# Patient Record
Sex: MALE
[De-identification: names, ages, dates, MRNs, and addresses within clinical notes are randomized per-mention and may not be internally consistent; named-entity substitution may affect disease eponyms.]

---

## 2017-09-11 ENCOUNTER — RX ONLY (OUTPATIENT)
Age: 82
Setting detail: RX ONLY
End: 2017-09-11

## 2018-01-03 ENCOUNTER — APPOINTMENT (RX ONLY)
Dept: URBAN - METROPOLITAN AREA CLINIC 114 | Facility: CLINIC | Age: 83
Setting detail: DERMATOLOGY
End: 2018-01-03

## 2018-01-03 PROBLEM — C44.311 BASAL CELL CARCINOMA OF SKIN OF NOSE: Status: ACTIVE | Noted: 2018-01-03

## 2018-01-03 PROBLEM — L57.0 ACTINIC KERATOSIS: Status: ACTIVE | Noted: 2018-01-03

## 2018-01-03 PROBLEM — L40.4 GUTTATE PSORIASIS: Status: ACTIVE | Noted: 2018-01-03

## 2018-01-03 PROCEDURE — ? SUPERFICIAL RADIATION TREATMENT

## 2018-01-03 PROCEDURE — 77332 RADIATION TREATMENT AID(S): CPT

## 2018-01-03 PROCEDURE — 77300 RADIATION THERAPY DOSE PLAN: CPT

## 2018-01-03 PROCEDURE — 77280 THER RAD SIMULAJ FIELD SMPL: CPT

## 2018-01-03 PROCEDURE — 77401: CPT

## 2018-01-03 NOTE — PROCEDURE: SUPERFICIAL RADIATION TREATMENT
Field Size (Applicator): 2.0 cm
Port Dimensions-Y Axis In Cm: 1.7
Cumulative Dose In Cgy (Optional): 320.5
Day Of The Week Treatment Administered: Wednesday
Total Number Of Fractions Rx 4: 15
Fractions / Week Rx 3: 3
Shielding Size (Optional- Include Units): 1.7  cm
Include Rx 2 When Rendering Additional Prescriptions: No
Computed Treatment Time In Min (Will Render The Same As Calculated Treatment Time If Left Blank): .49
Daily Fractionated Dose (Optional- Include Units): 320.5 cGy
Bill For Dosimetry/Render Treatment Time Calculation In Note: Yes
Patient Positioning: Supine
Treatment Device Design After Initial Simulation Justification (Will Render If Bill For Treatment Devices = Yes): The patient is status post radiation simulation and is evaluated as to the use of additional devices for shielding and placement for radiation therapy.
Energy (Optional-Please Include Units): 70 kV
Limb Positioning Or Elevation: knees bent with sponge
Simple Simulation Afterword Text Will Be Included With Simple Simulations (Indications............): The patient had a complete consultation regarding all applicable modalities for the treatment of their skin cancer and based on a variety of factors including the type of tumor, size, and location, the relevant medical history as well as local tissue factors, the functional status of the individual, the ability to perform necessary postoperative wound instructions and the need for simultaneous treatments as well as overall wound healing status, it was determined that the patient would begin radiation therapy treatment for skin cancer.  A full simulation and treatment device design was performed including the determination and formulation of appropriate simple and complex devices including lead shield of 0.762 mm thickness to form molded customized shielding to specifically correlate with the lesion size including treatment margin.  The custom lead shield is adequate to accommodate the appropriate applicator and provide adequate shielding around the treatment site.  The specific field applicator, shields, and devices both simple and complex as well as the specific patient setup is outlined below.  The patient was given a full consent for superficial radiation to both verbally and in writing and the full determination of patient's eligibility for treatment and selection is outlined on the patient eligibility and treatment selection form.  The specific superficial radiotherapy prescription was determined and was documented on the superficial radiotherapy prescription form.  A treatment calculation was also performed and documented on the treatment calculation form.  Based on the prescription, the patient was scheduled for a series of fractional treatments.
Custom Shielding Afterword Text Will Not Be Included With Simple Simulations (X X Y Cm............): port to correlate with the lesion size, including treatment margin. The custom lead shield is adequate to accommodate the appropriate applicator and provide adequate shielding around the treatment site. Additional shielding (as noted below) is used to protect sensitive, normal tissues.
Fractionation Number (Evaluation): 5
Detail Level: Detailed
Energy Output In Cgy/Min (Optional): 656
Prescription Used: 1
Depth (Optional-Please Include Units): 1.1 cm
Treatment Time In Min (Optional): 0.49
Intro Statement (Will Not Render If Left Blank): The patient is undergoing superficial radiation therapy for skin cancer and presents for weekly evaluation and management.  Per protocol and as documented on the flow sheet, the patient was questioned as to subjective redness, pruritus, pain, drainage, fatigue, or any other symptoms.  Objectively, the radiation area was evaluated with regards to erythema, atrophy, scale, crusting, erosion, ulceration, edema, purpura, tenderness, warmth, drainage, and any other findings.  The plan was extensively reviewed including the dose, and dosing schedule.  The simulation and clinical setup was also reviewed as was the external and any internal shields and based on this review the appropriateness and sufficiency of treatment was determined.
Treatment Time / Fractionation (Optional- Include Units): 0.49 min
Treatment Margins In Cm: 0.5
Additional Prescription Justification Text: If there is any interruption in treatment exceeding 5 days please see Decay and Dose Adjustment Calculation and complete treatment under Prescription 2, 3 or 4 as appropriate.
Body Location Override (Optional): Left dorsum ( bridge of nose)
Total Number Of Fractions: 16
Time Dose Fractionation (Optional- Include Units If Applicable): 100 TDF
Custom Shielding Preamble Text Will Not Be Included With Simple Simulations (.......... X X Y Cm): A lead shield of 0.762 mm thickness is utilized to form a molded, custom shield with a
Assessment: Appropriate reaction
Functional Status: 0 (fully active)
Simple Simulation Preamble Text Will Be Included With Simple Simulations (.......... Indications): Simple simulation was performed today for the following reasons:
Total Dose (Optional-Please Include Units): 5128.0  cGy

## 2018-01-05 ENCOUNTER — APPOINTMENT (RX ONLY)
Dept: URBAN - METROPOLITAN AREA CLINIC 114 | Facility: CLINIC | Age: 83
Setting detail: DERMATOLOGY
End: 2018-01-05

## 2018-01-05 PROBLEM — C44.311 BASAL CELL CARCINOMA OF SKIN OF NOSE: Status: ACTIVE | Noted: 2018-01-05

## 2018-01-05 PROCEDURE — ? SUPERFICIAL RADIATION TREATMENT

## 2018-01-05 PROCEDURE — 77401: CPT

## 2018-01-05 NOTE — PROCEDURE: SUPERFICIAL RADIATION TREATMENT
Port Dimensions-Y Axis In Cm: 1.7
Include Rx 4 When Rendering Additional Prescriptions: No
Simple Simulation Afterword Text Will Be Included With Simple Simulations (Indications............): The patient had a complete consultation regarding all applicable modalities for the treatment of their skin cancer and based on a variety of factors including the type of tumor, size, and location, the relevant medical history as well as local tissue factors, the functional status of the individual, the ability to perform necessary postoperative wound instructions and the need for simultaneous treatments as well as overall wound healing status, it was determined that the patient would begin radiation therapy treatment for skin cancer.  A full simulation and treatment device design was performed including the determination and formulation of appropriate simple and complex devices including lead shield of 0.762 mm thickness to form molded customized shielding to specifically correlate with the lesion size including treatment margin.  The custom lead shield is adequate to accommodate the appropriate applicator and provide adequate shielding around the treatment site.  The specific field applicator, shields, and devices both simple and complex as well as the specific patient setup is outlined below.  The patient was given a full consent for superficial radiation to both verbally and in writing and the full determination of patient's eligibility for treatment and selection is outlined on the patient eligibility and treatment selection form.  The specific superficial radiotherapy prescription was determined and was documented on the superficial radiotherapy prescription form.  A treatment calculation was also performed and documented on the treatment calculation form.  Based on the prescription, the patient was scheduled for a series of fractional treatments.
Number Of Treatment Days: 1
Field Size (Applicator): 2.0 cm
Detail Level: Detailed
Shielding Size (Optional- Include Units): 1.7  cm
Treatment Device Design After Initial Simulation Justification (Will Render If Bill For Treatment Devices = Yes): The patient is status post radiation simulation and is evaluated as to the use of additional devices for shielding and placement for radiation therapy.
Treatment Time In Min (Optional): 0.49
Assessment: Appropriate reaction
Additional Prescription Justification Text: If there is any interruption in treatment exceeding 5 days please see Decay and Dose Adjustment Calculation and complete treatment under Prescription 2, 3 or 4 as appropriate.
Depth (Optional-Please Include Units): 1.1 cm
Energy (Include Units): 70 kV
Computed Treatment Time In Min (Will Render The Same As Calculated Treatment Time If Left Blank): .49
Dose / Tx In Cgy (Optional): 320.5
Fractions / Week: 3
Time Dose Fractionation (Optional- Include Units If Applicable): 100 TDF
Energy Output In Cgy/Min (Optional): 653
Total Number Of Fractions Rx 4: 15
Bill For Radiation Treatment: Yes
Fractions / Week Rx 2: 5
Cumulative Dose In Cgy (Optional): 641.0
Limb Positioning Or Elevation: knees bent with sponge
Daily Fractionated Dose (Optional- Include Units): 320.5 cGy
Fractionation Number: 2
Body Location Override (Optional): Left dorsum ( bridge of nose)
Custom Shielding Preamble Text Will Not Be Included With Simple Simulations (.......... X X Y Cm): A lead shield of 0.762 mm thickness is utilized to form a molded, custom shield with a
Intro Statement (Will Not Render If Left Blank): The patient is undergoing superficial radiation therapy for skin cancer and presents for weekly evaluation and management.  Per protocol and as documented on the flow sheet, the patient was questioned as to subjective redness, pruritus, pain, drainage, fatigue, or any other symptoms.  Objectively, the radiation area was evaluated with regards to erythema, atrophy, scale, crusting, erosion, ulceration, edema, purpura, tenderness, warmth, drainage, and any other findings.  The plan was extensively reviewed including the dose, and dosing schedule.  The simulation and clinical setup was also reviewed as was the external and any internal shields and based on this review the appropriateness and sufficiency of treatment was determined.
Day Of The Week Treatment Administered: Friday
Custom Shielding Afterword Text Will Not Be Included With Simple Simulations (X X Y Cm............): port to correlate with the lesion size, including treatment margin. The custom lead shield is adequate to accommodate the appropriate applicator and provide adequate shielding around the treatment site. Additional shielding (as noted below) is used to protect sensitive, normal tissues.
Total Dose (Optional-Please Include Units): 5128.0  cGy
Treatment Margins In Cm: 0.5
Functional Status: 0 (fully active)
Simple Simulation Preamble Text Will Be Included With Simple Simulations (.......... Indications): Simple simulation was performed today for the following reasons:
Patient Positioning: Supine
Treatment Time / Fractionation (Optional- Include Units): 0.49 min
Total Number Of Fractions: 16

## 2018-01-08 ENCOUNTER — APPOINTMENT (RX ONLY)
Dept: URBAN - METROPOLITAN AREA CLINIC 114 | Facility: CLINIC | Age: 83
Setting detail: DERMATOLOGY
End: 2018-01-08

## 2018-01-08 PROBLEM — E78.5 HYPERLIPIDEMIA, UNSPECIFIED: Status: ACTIVE | Noted: 2018-01-08

## 2018-01-08 PROBLEM — D239 BENIGN NEOPLASM OF SKIN, SITE UNSPECIFIED: Status: ACTIVE | Noted: 2018-01-08

## 2018-01-08 PROBLEM — C44.311 BASAL CELL CARCINOMA OF SKIN OF NOSE: Status: ACTIVE | Noted: 2018-01-08

## 2018-01-08 PROBLEM — L57.0 ACTINIC KERATOSIS: Status: ACTIVE | Noted: 2018-01-08

## 2018-01-08 PROBLEM — Z85.828 PERSONAL HISTORY OF OTHER MALIGNANT NEOPLASM OF SKIN: Status: ACTIVE | Noted: 2018-01-08

## 2018-01-08 PROCEDURE — 77401: CPT

## 2018-01-08 PROCEDURE — ? SUPERFICIAL RADIATION TREATMENT

## 2018-01-08 NOTE — PROCEDURE: SUPERFICIAL RADIATION TREATMENT
Bill For Treatment Devices Only: No
Depth (Optional-Please Include Units): 1.1 cm
Field Size (Applicator): 2.0 cm
Shielding Size (Optional- Include Units): 1.7  cm
Fractions / Week: 3
Initial Radiation Treatment Planning (Will Render If Bill Simulation = Yes): The patient had a complete consultation regarding all applicable modalities for the treatment of their skin cancer and based on a variety of factors including the type of tumor, size, and location, the relevant medical history as well as local tissue factors, the functional status of the individual, the ability to perform necessary postoperative wound instructions and the need for simultaneous treatments as well as overall wound healing status, it was determined that the patient would begin radiation therapy treatment for skin cancer.  A full simulation and treatment device design was performed including the determination and formulation of appropriate simple and complex devices including lead shield of 0.762 mm thickness to form molded customized shielding to specifically correlate with the lesion size including treatment margin.  The custom lead shield is adequate to accommodate the appropriate applicator and provide adequate shielding around the treatment site.  The specific field applicator, shields, and devices both simple and complex as well as the specific patient setup is outlined below.  The patient was given a full consent for superficial radiation to both verbally and in writing and the full determination of patient's eligibility for treatment and selection is outlined on the patient eligibility and treatment selection form.  The specific superficial radiotherapy prescription was determined and was documented on the superficial radiotherapy prescription form.  A treatment calculation was also performed and documented on the treatment calculation form.  Based on the prescription, the patient was scheduled for a series of fractional treatments.
Cumulative Dose In Cgy (Optional): 961.5
Patient Positioning: Supine
Total Dose (Optional-Please Include Units): 5128.0  cGy
Total Number Of Fractions: 16
Daily Fractionated Dose (Optional- Include Units): 320.5 cGy
Limb Positioning Or Elevation: knees bent with sponge
Port Dimensions-Y Axis In Cm: 1.7
Intro Statement (Will Not Render If Left Blank): The patient is undergoing superficial radiation therapy for skin cancer and presents for weekly evaluation and management.  Per protocol and as documented on the flow sheet, the patient was questioned as to subjective redness, pruritus, pain, drainage, fatigue, or any other symptoms.  Objectively, the radiation area was evaluated with regards to erythema, atrophy, scale, crusting, erosion, ulceration, edema, purpura, tenderness, warmth, drainage, and any other findings.  The plan was extensively reviewed including the dose, and dosing schedule.  The simulation and clinical setup was also reviewed as was the external and any internal shields and based on this review the appropriateness and sufficiency of treatment was determined.
Fractions / Week Rx 4: 5
Assessment: Appropriate reaction
Time Dose Fractionation (Optional- Include Units If Applicable): 100 TDF
Custom Shielding Preamble Text Will Not Be Included With Simple Simulations (.......... X X Y Cm): A lead shield of 0.762 mm thickness is utilized to form a molded, custom shield with a
Day Of The Week Treatment Administered: Monday
Dose / Tx In Cgy (Optional): 320.5
Treatment Time In Min (Optional): 0.49
Treatment Device Design After Initial Simulation Justification (Will Render If Bill For Treatment Devices = Yes): The patient is status post radiation simulation and is evaluated as to the use of additional devices for shielding and placement for radiation therapy.
Custom Shielding Afterword Text Will Not Be Included With Simple Simulations (X X Y Cm............): port to correlate with the lesion size, including treatment margin. The custom lead shield is adequate to accommodate the appropriate applicator and provide adequate shielding around the treatment site. Additional shielding (as noted below) is used to protect sensitive, normal tissues.
Functional Status: 0 (fully active)
Body Location Override (Optional): Left dorsum ( bridge of nose)
Total Number Of Fractions Rx 2: 15
Treatment Time / Fractionation (Optional- Include Units): 0.49 min
Treatment Margins In Cm: 0.5
Number Of Treatment Days: 1
Additional Prescription Justification Text: If there is any interruption in treatment exceeding 5 days please see Decay and Dose Adjustment Calculation and complete treatment under Prescription 2, 3 or 4 as appropriate.
Detail Level: Detailed
Computed Treatment Time In Min (Will Render The Same As Calculated Treatment Time If Left Blank): .49
Energy (Optional-Please Include Units): 70 kV
Bill For Radiation Treatment: Yes
Energy Output In Cgy/Min (Optional): 653
Simple Simulation Preamble Text Will Be Included With Simple Simulations (.......... Indications): Simple simulation was performed today for the following reasons:

## 2018-01-10 ENCOUNTER — APPOINTMENT (RX ONLY)
Dept: URBAN - METROPOLITAN AREA CLINIC 114 | Facility: CLINIC | Age: 83
Setting detail: DERMATOLOGY
End: 2018-01-10

## 2018-01-10 PROBLEM — L82.1 OTHER SEBORRHEIC KERATOSIS: Status: ACTIVE | Noted: 2018-01-10

## 2018-01-10 PROBLEM — C44.311 BASAL CELL CARCINOMA OF SKIN OF NOSE: Status: ACTIVE | Noted: 2018-01-10

## 2018-01-10 PROCEDURE — ? SUPERFICIAL RADIATION TREATMENT

## 2018-01-10 PROCEDURE — 77401: CPT

## 2018-01-10 NOTE — PROCEDURE: SUPERFICIAL RADIATION TREATMENT
Ricardo For Simulation Without Treatment Device Design (Simple Simulation): No
Treatment Margins In Cm: 0.5
Field Size (Applicator): 2.0 cm
Fractions / Week Rx 4: 5
Day Of The Week Treatment Administered: Wednesday
Detail Level: Detailed
Energy (Optional-Please Include Units): 70 kV
Patient Positioning: Supine
Body Location Override (Optional): Left dorsum ( bridge of nose)
Port Dimensions-Y Axis In Cm: 1.7
Computed Treatment Time In Min (Will Render The Same As Calculated Treatment Time If Left Blank): .49
Initial Radiation Treatment Planning (Will Render If Bill Simulation = Yes): The patient had a complete consultation regarding all applicable modalities for the treatment of their skin cancer and based on a variety of factors including the type of tumor, size, and location, the relevant medical history as well as local tissue factors, the functional status of the individual, the ability to perform necessary postoperative wound instructions and the need for simultaneous treatments as well as overall wound healing status, it was determined that the patient would begin radiation therapy treatment for skin cancer.  A full simulation and treatment device design was performed including the determination and formulation of appropriate simple and complex devices including lead shield of 0.762 mm thickness to form molded customized shielding to specifically correlate with the lesion size including treatment margin.  The custom lead shield is adequate to accommodate the appropriate applicator and provide adequate shielding around the treatment site.  The specific field applicator, shields, and devices both simple and complex as well as the specific patient setup is outlined below.  The patient was given a full consent for superficial radiation to both verbally and in writing and the full determination of patient's eligibility for treatment and selection is outlined on the patient eligibility and treatment selection form.  The specific superficial radiotherapy prescription was determined and was documented on the superficial radiotherapy prescription form.  A treatment calculation was also performed and documented on the treatment calculation form.  Based on the prescription, the patient was scheduled for a series of fractional treatments.
Prescription Used: 1
Fractionation Number: 4
Bill For Radiation Treatment: Yes
Total Number Of Fractions Rx 3: 15
Dose Per Fractionation In Cgy (Optional): 320.5
Time Dose Fractionation (Optional- Include Units If Applicable): 100 TDF
Total Number Of Fractions: 16
Intro Statement (Will Not Render If Left Blank): The patient is undergoing superficial radiation therapy for skin cancer and presents for weekly evaluation and management.  Per protocol and as documented on the flow sheet, the patient was questioned as to subjective redness, pruritus, pain, drainage, fatigue, or any other symptoms.  Objectively, the radiation area was evaluated with regards to erythema, atrophy, scale, crusting, erosion, ulceration, edema, purpura, tenderness, warmth, drainage, and any other findings.  The plan was extensively reviewed including the dose, and dosing schedule.  The simulation and clinical setup was also reviewed as was the external and any internal shields and based on this review the appropriateness and sufficiency of treatment was determined.
Assessment: Appropriate reaction
Treatment Time / Fractionation (Optional- Include Units): 0.49 min
Fractions / Week Rx 3: 3
Simple Simulation Preamble Text Will Be Included With Simple Simulations (.......... Indications): Simple simulation was performed today for the following reasons:
Cumulative Dose In Cgy (Optional): 1282
Daily Fractionated Dose (Optional- Include Units): 320.5 cGy
Total Dose (Optional-Please Include Units): 5128.0  cGy
Custom Shielding Preamble Text Will Not Be Included With Simple Simulations (.......... X X Y Cm): A lead shield of 0.762 mm thickness is utilized to form a molded, custom shield with a
Limb Positioning Or Elevation: knees bent with sponge
Shielding Size (Optional- Include Units): 1.7  cm
Custom Shielding Afterword Text Will Not Be Included With Simple Simulations (X X Y Cm............): port to correlate with the lesion size, including treatment margin. The custom lead shield is adequate to accommodate the appropriate applicator and provide adequate shielding around the treatment site. Additional shielding (as noted below) is used to protect sensitive, normal tissues.
Treatment Time In Min (Optional): 0.49
Energy Output In Cgy/Min (Optional): 656
Depth (Optional-Please Include Units): 1.1 cm
Additional Prescription Justification Text: If there is any interruption in treatment exceeding 5 days please see Decay and Dose Adjustment Calculation and complete treatment under Prescription 2, 3 or 4 as appropriate.
Functional Status: 0 (fully active)
Treatment Device Design After Initial Simulation Justification (Will Render If Bill For Treatment Devices = Yes): The patient is status post radiation simulation and is evaluated as to the use of additional devices for shielding and placement for radiation therapy.

## 2018-01-12 ENCOUNTER — APPOINTMENT (RX ONLY)
Dept: URBAN - METROPOLITAN AREA CLINIC 114 | Facility: CLINIC | Age: 83
Setting detail: DERMATOLOGY
End: 2018-01-12

## 2018-01-12 PROBLEM — L40.4 GUTTATE PSORIASIS: Status: ACTIVE | Noted: 2018-01-12

## 2018-01-12 PROBLEM — C44.311 BASAL CELL CARCINOMA OF SKIN OF NOSE: Status: ACTIVE | Noted: 2018-01-12

## 2018-01-12 PROBLEM — L57.0 ACTINIC KERATOSIS: Status: ACTIVE | Noted: 2018-01-12

## 2018-01-12 PROCEDURE — ? SUPERFICIAL RADIATION TREATMENT

## 2018-01-12 PROCEDURE — 77401: CPT

## 2018-01-12 NOTE — PROCEDURE: SUPERFICIAL RADIATION TREATMENT
Treatment Device Design After Initial Simulation Justification (Will Render If Bill For Treatment Devices = Yes): The patient is status post radiation simulation and is evaluated as to the use of additional devices for shielding and placement for radiation therapy.
Depth (Optional-Please Include Units): 1.1 cm
Render Patient Eligibility And Selection In Note?: No
Body Location Override (Optional): Left dorsum ( bridge of nose)
Daily Fractionated Dose (Optional- Include Units): 320.5 cGy
Number Of Days Off Treatment: 1
Port Dimensions-Y Axis In Cm: 1.7
Shielding Size (Optional- Include Units): 1.7  cm
Additional Prescription Justification Text: If there is any interruption in treatment exceeding 5 days please see Decay and Dose Adjustment Calculation and complete treatment under Prescription 2, 3 or 4 as appropriate.
Fractions / Week Rx 3: 3
Dose Per Fractionation In Cgy (Optional): 320.5
Bill For Radiation Treatment: Yes
Field Size (Applicator): 2.0 cm
Fractionation Number: 5
Total Number Of Fractions Rx 4: 15
Treatment Time In Min (Optional): 0.49
Functional Status: 0 (fully active)
Assessment: Appropriate reaction
Energy (Include Units): 70 kV
Treatment Margins In Cm: 0.5
Treatment Time / Fractionation (Optional- Include Units): 0.49 min
Time Dose Fractionation (Optional- Include Units If Applicable): 100 TDF
Day Of The Week Treatment Administered: Friday
Simple Simulation Preamble Text Will Be Included With Simple Simulations (.......... Indications): Simple simulation was performed today for the following reasons:
Computed Treatment Time In Min (Will Render The Same As Calculated Treatment Time If Left Blank): .49
Simple Simulation Afterword Text Will Be Included With Simple Simulations (Indications............): The patient had a complete consultation regarding all applicable modalities for the treatment of their skin cancer and based on a variety of factors including the type of tumor, size, and location, the relevant medical history as well as local tissue factors, the functional status of the individual, the ability to perform necessary postoperative wound instructions and the need for simultaneous treatments as well as overall wound healing status, it was determined that the patient would begin radiation therapy treatment for skin cancer.  A full simulation and treatment device design was performed including the determination and formulation of appropriate simple and complex devices including lead shield of 0.762 mm thickness to form molded customized shielding to specifically correlate with the lesion size including treatment margin.  The custom lead shield is adequate to accommodate the appropriate applicator and provide adequate shielding around the treatment site.  The specific field applicator, shields, and devices both simple and complex as well as the specific patient setup is outlined below.  The patient was given a full consent for superficial radiation to both verbally and in writing and the full determination of patient's eligibility for treatment and selection is outlined on the patient eligibility and treatment selection form.  The specific superficial radiotherapy prescription was determined and was documented on the superficial radiotherapy prescription form.  A treatment calculation was also performed and documented on the treatment calculation form.  Based on the prescription, the patient was scheduled for a series of fractional treatments.
Total Dose (Optional-Please Include Units): 5128.0  cGy
Cumulative Dose In Cgy (Optional): 1602.5
Intro Statement (Will Not Render If Left Blank): The patient is undergoing superficial radiation therapy for skin cancer and presents for weekly evaluation and management.  Per protocol and as documented on the flow sheet, the patient was questioned as to subjective redness, pruritus, pain, drainage, fatigue, or any other symptoms.  Objectively, the radiation area was evaluated with regards to erythema, atrophy, scale, crusting, erosion, ulceration, edema, purpura, tenderness, warmth, drainage, and any other findings.  The plan was extensively reviewed including the dose, and dosing schedule.  The simulation and clinical setup was also reviewed as was the external and any internal shields and based on this review the appropriateness and sufficiency of treatment was determined.
Total Number Of Fractions: 16
Energy Output In Cgy/Min (Optional): 659
Custom Shielding Afterword Text Will Not Be Included With Simple Simulations (X X Y Cm............): port to correlate with the lesion size, including treatment margin. The custom lead shield is adequate to accommodate the appropriate applicator and provide adequate shielding around the treatment site. Additional shielding (as noted below) is used to protect sensitive, normal tissues.
Detail Level: Detailed
Limb Positioning Or Elevation: knees bent with sponge
Patient Positioning: Supine
Custom Shielding Preamble Text Will Not Be Included With Simple Simulations (.......... X X Y Cm): A lead shield of 0.762 mm thickness is utilized to form a molded, custom shield with a

## 2018-01-15 ENCOUNTER — APPOINTMENT (RX ONLY)
Dept: URBAN - METROPOLITAN AREA CLINIC 114 | Facility: CLINIC | Age: 83
Setting detail: DERMATOLOGY
End: 2018-01-15

## 2018-01-15 DIAGNOSIS — L58.9 RADIODERMATITIS, UNSPECIFIED: ICD-10-CM

## 2018-01-15 PROBLEM — C44.311 BASAL CELL CARCINOMA OF SKIN OF NOSE: Status: ACTIVE | Noted: 2018-01-15

## 2018-01-15 PROCEDURE — ? SUPERFICIAL RADIATION TREATMENT

## 2018-01-15 PROCEDURE — 77401: CPT

## 2018-01-15 PROCEDURE — 99212 OFFICE O/P EST SF 10 MIN: CPT | Mod: 25

## 2018-01-15 NOTE — PROCEDURE: SUPERFICIAL RADIATION TREATMENT
Total Number Of Fractions Rx 3: 15
Bill For Treatment Devices Only: No
Port Dimensions-X Axis In Cm: 1.7
Field Size (Applicator): 2.0 cm
Shielding Size (Optional- Include Units): 1.7  cm
Initial Radiation Treatment Planning (Will Render If Bill Simulation = Yes): The patient had a complete consultation regarding all applicable modalities for the treatment of their skin cancer and based on a variety of factors including the type of tumor, size, and location, the relevant medical history as well as local tissue factors, the functional status of the individual, the ability to perform necessary postoperative wound instructions and the need for simultaneous treatments as well as overall wound healing status, it was determined that the patient would begin radiation therapy treatment for skin cancer.  A full simulation and treatment device design was performed including the determination and formulation of appropriate simple and complex devices including lead shield of 0.762 mm thickness to form molded customized shielding to specifically correlate with the lesion size including treatment margin.  The custom lead shield is adequate to accommodate the appropriate applicator and provide adequate shielding around the treatment site.  The specific field applicator, shields, and devices both simple and complex as well as the specific patient setup is outlined below.  The patient was given a full consent for superficial radiation to both verbally and in writing and the full determination of patient's eligibility for treatment and selection is outlined on the patient eligibility and treatment selection form.  The specific superficial radiotherapy prescription was determined and was documented on the superficial radiotherapy prescription form.  A treatment calculation was also performed and documented on the treatment calculation form.  Based on the prescription, the patient was scheduled for a series of fractional treatments.
Limb Positioning Or Elevation: knees bent with sponge
Intro Statement (Will Not Render If Left Blank): The patient is undergoing superficial radiation therapy for skin cancer and presents for weekly evaluation and management.  Per protocol and as documented on the flow sheet, the patient was questioned as to subjective redness, pruritus, pain, drainage, fatigue, or any other symptoms.  Objectively, the radiation area was evaluated with regards to erythema, atrophy, scale, crusting, erosion, ulceration, edema, purpura, tenderness, warmth, drainage, and any other findings.  The plan was extensively reviewed including the dose, and dosing schedule.  The simulation and clinical setup was also reviewed as was the external and any internal shields and based on this review the appropriateness and sufficiency of treatment was determined.
Patient Positioning: Supine
Fractionation Number (Evaluation): 6
Energy Output In Cgy/Min (Optional): 655
Fractions / Week Rx 4: 5
Body Location Override (Optional): Left dorsum ( bridge of nose)
Custom Shielding Preamble Text Will Not Be Included With Simple Simulations (.......... X X Y Cm): A lead shield of 0.762 mm thickness is utilized to form a molded, custom shield with a
Depth (Optional-Please Include Units): 1.1 cm
Bill For Radiation Treatment: Yes
Number Of Treatment Days: 1
Custom Shielding Afterword Text Will Not Be Included With Simple Simulations (X X Y Cm............): port to correlate with the lesion size, including treatment margin. The custom lead shield is adequate to accommodate the appropriate applicator and provide adequate shielding around the treatment site. Additional shielding (as noted below) is used to protect sensitive, normal tissues.
Fractions / Week: 3
Cumulative Dose In Cgy (Optional): 9673
Treatment Margins In Cm: 0.5
Dose / Tx In Cgy (Optional): 320.5
Treatment Time / Fractionation (Optional- Include Units): 0.49 min
Treatment Device Design After Initial Simulation Justification (Will Render If Bill For Treatment Devices = Yes): The patient is status post radiation simulation and is evaluated as to the use of additional devices for shielding and placement for radiation therapy.
Daily Fractionated Dose (Optional- Include Units): 320.5 cGy
Day Of The Week Treatment Administered: Monday
Detail Level: Detailed
Assessment: Appropriate reaction
Energy (Include Units): 70 kV
Treatment Time In Min (Optional): 0.49
Total Dose (Optional-Please Include Units): 5128.0  cGy
Additional Prescription Justification Text: If there is any interruption in treatment exceeding 5 days please see Decay and Dose Adjustment Calculation and complete treatment under Prescription 2, 3 or 4 as appropriate.
Time Dose Fractionation (Optional- Include Units If Applicable): 100 TDF
Total Number Of Fractions: 16
Simple Simulation Preamble Text Will Be Included With Simple Simulations (.......... Indications): Simple simulation was performed today for the following reasons:
Computed Treatment Time In Min (Will Render The Same As Calculated Treatment Time If Left Blank): .49
Functional Status: 0 (fully active)

## 2018-01-17 ENCOUNTER — APPOINTMENT (RX ONLY)
Dept: URBAN - METROPOLITAN AREA CLINIC 114 | Facility: CLINIC | Age: 83
Setting detail: DERMATOLOGY
End: 2018-01-17

## 2018-01-17 PROBLEM — C44.311 BASAL CELL CARCINOMA OF SKIN OF NOSE: Status: ACTIVE | Noted: 2018-01-17

## 2018-01-17 PROBLEM — L57.0 ACTINIC KERATOSIS: Status: ACTIVE | Noted: 2018-01-17

## 2018-01-17 PROBLEM — C44.91 BASAL CELL CARCINOMA OF SKIN, UNSPECIFIED: Status: ACTIVE | Noted: 2018-01-17

## 2018-01-17 PROCEDURE — ? COUNSELING

## 2018-01-17 PROCEDURE — ? SUPERFICIAL RADIATION TREATMENT

## 2018-01-17 PROCEDURE — 77401: CPT

## 2018-01-17 PROCEDURE — 99212 OFFICE O/P EST SF 10 MIN: CPT | Mod: 25

## 2018-01-17 NOTE — PROCEDURE: SUPERFICIAL RADIATION TREATMENT
Render Additional Prescriptions In Note?: No
Intro Statement (Will Not Render If Left Blank): The patient is undergoing superficial radiation therapy for skin cancer and presents for weekly evaluation and management.  Per protocol and as documented on the flow sheet, the patient was questioned as to subjective redness, pruritus, pain, drainage, fatigue, or any other symptoms.  Objectively, the radiation area was evaluated with regards to erythema, atrophy, scale, crusting, erosion, ulceration, edema, purpura, tenderness, warmth, drainage, and any other findings.  The plan was extensively reviewed including the dose, and dosing schedule.  The simulation and clinical setup was also reviewed as was the external and any internal shields and based on this review the appropriateness and sufficiency of treatment was determined.
Energy (Include Units): 70 kV
Custom Shielding Preamble Text Will Not Be Included With Simple Simulations (.......... X X Y Cm): A lead shield of 0.762 mm thickness is utilized to form a molded, custom shield with a
Dimensions-X Axis In Cm: 1.7
Total Number Of Fractions Rx 3: 15
Number Of Days Off Treatment: 1
Fractionation Number (Evaluation): 7
Initial Radiation Treatment Planning (Will Render If Bill Simulation = Yes): The patient had a complete consultation regarding all applicable modalities for the treatment of their skin cancer and based on a variety of factors including the type of tumor, size, and location, the relevant medical history as well as local tissue factors, the functional status of the individual, the ability to perform necessary postoperative wound instructions and the need for simultaneous treatments as well as overall wound healing status, it was determined that the patient would begin radiation therapy treatment for skin cancer.  A full simulation and treatment device design was performed including the determination and formulation of appropriate simple and complex devices including lead shield of 0.762 mm thickness to form molded customized shielding to specifically correlate with the lesion size including treatment margin.  The custom lead shield is adequate to accommodate the appropriate applicator and provide adequate shielding around the treatment site.  The specific field applicator, shields, and devices both simple and complex as well as the specific patient setup is outlined below.  The patient was given a full consent for superficial radiation to both verbally and in writing and the full determination of patient's eligibility for treatment and selection is outlined on the patient eligibility and treatment selection form.  The specific superficial radiotherapy prescription was determined and was documented on the superficial radiotherapy prescription form.  A treatment calculation was also performed and documented on the treatment calculation form.  Based on the prescription, the patient was scheduled for a series of fractional treatments.
Functional Status: 0 (fully active)
Fractions / Week Rx 4: 5
Dose / Tx In Cgy (Optional): 320.5
Time Dose Fractionation (Optional- Include Units If Applicable): 100 TDF
Fractions / Week Rx 3: 3
Custom Shielding Afterword Text Will Not Be Included With Simple Simulations (X X Y Cm............): port to correlate with the lesion size, including treatment margin. The custom lead shield is adequate to accommodate the appropriate applicator and provide adequate shielding around the treatment site. Additional shielding (as noted below) is used to protect sensitive, normal tissues.
Treatment Margins In Cm: 0.5
Bill For Radiation Treatment: Yes
Treatment Device Design After Initial Simulation Justification (Will Render If Bill For Treatment Devices = Yes): The patient is status post radiation simulation and is evaluated as to the use of additional devices for shielding and placement for radiation therapy.
Computed Treatment Time In Min (Will Render The Same As Calculated Treatment Time If Left Blank): .49
Simple Simulation Preamble Text Will Be Included With Simple Simulations (.......... Indications): Simple simulation was performed today for the following reasons:
Depth (Optional-Please Include Units): 1.1 cm
Energy Output In Cgy/Min (Optional): 655
Treatment Time In Min (Optional): 0.49
Total Number Of Fractions: 16
Patient Positioning: Supine
Field Size (Applicator): 2.0 cm
Cumulative Dose In Cgy (Optional): 2243.5
Additional Prescription Justification Text: If there is any interruption in treatment exceeding 5 days please see Decay and Dose Adjustment Calculation and complete treatment under Prescription 2, 3 or 4 as appropriate.
Total Dose (Optional-Please Include Units): 5128.0  cGy
Limb Positioning Or Elevation: knees bent with sponge
Treatment Time / Fractionation (Optional- Include Units): 0.49 min
Daily Fractionated Dose (Optional- Include Units): 320.5 cGy
Assessment: Appropriate reaction
Detail Level: Detailed
Body Location Override (Optional): Left dorsum ( bridge of nose)
Day Of The Week Treatment Administered: Wednesday
Shielding Size (Optional- Include Units): 1.7  cm

## 2018-01-19 ENCOUNTER — APPOINTMENT (RX ONLY)
Dept: URBAN - METROPOLITAN AREA CLINIC 114 | Facility: CLINIC | Age: 83
Setting detail: DERMATOLOGY
End: 2018-01-19

## 2018-01-19 PROBLEM — L57.8 OTHER SKIN CHANGES DUE TO CHRONIC EXPOSURE TO NONIONIZING RADIATION: Status: ACTIVE | Noted: 2018-01-19

## 2018-01-19 PROBLEM — L40.0 PSORIASIS VULGARIS: Status: ACTIVE | Noted: 2018-01-19

## 2018-01-19 PROBLEM — C44.311 BASAL CELL CARCINOMA OF SKIN OF NOSE: Status: ACTIVE | Noted: 2018-01-19

## 2018-01-19 PROCEDURE — 77401: CPT

## 2018-01-19 PROCEDURE — ? SUPERFICIAL RADIATION TREATMENT

## 2018-01-19 NOTE — PROCEDURE: SUPERFICIAL RADIATION TREATMENT
Assessment: Appropriate reaction
Fractions / Week: 3
Day Of The Week Treatment Administered: Friday
Intro Statement (Will Not Render If Left Blank): The patient is undergoing superficial radiation therapy for skin cancer and presents for weekly evaluation and management.  Per protocol and as documented on the flow sheet, the patient was questioned as to subjective redness, pruritus, pain, drainage, fatigue, or any other symptoms.  Objectively, the radiation area was evaluated with regards to erythema, atrophy, scale, crusting, erosion, ulceration, edema, purpura, tenderness, warmth, drainage, and any other findings.  The plan was extensively reviewed including the dose, and dosing schedule.  The simulation and clinical setup was also reviewed as was the external and any internal shields and based on this review the appropriateness and sufficiency of treatment was determined.
Total Number Of Fractions Rx 3: 15
Fractions / Week Rx 4: 5
Render Additional Prescriptions In Note?: No
Total Dose (Optional-Please Include Units): 5128.0  cGy
Fractionation Number: 8
Dimensions-Y Axis In Cm: 1.7
Additional Prescription Justification Text: If there is any interruption in treatment exceeding 5 days please see Decay and Dose Adjustment Calculation and complete treatment under Prescription 2, 3 or 4 as appropriate.
Energy (Optional-Please Include Units): 70 kV
Depth (Optional-Please Include Units): 1.1 cm
Fractionation Number (Evaluation): 7
Patient Positioning: Supine
Simple Simulation Afterword Text Will Be Included With Simple Simulations (Indications............): The patient had a complete consultation regarding all applicable modalities for the treatment of their skin cancer and based on a variety of factors including the type of tumor, size, and location, the relevant medical history as well as local tissue factors, the functional status of the individual, the ability to perform necessary postoperative wound instructions and the need for simultaneous treatments as well as overall wound healing status, it was determined that the patient would begin radiation therapy treatment for skin cancer.  A full simulation and treatment device design was performed including the determination and formulation of appropriate simple and complex devices including lead shield of 0.762 mm thickness to form molded customized shielding to specifically correlate with the lesion size including treatment margin.  The custom lead shield is adequate to accommodate the appropriate applicator and provide adequate shielding around the treatment site.  The specific field applicator, shields, and devices both simple and complex as well as the specific patient setup is outlined below.  The patient was given a full consent for superficial radiation to both verbally and in writing and the full determination of patient's eligibility for treatment and selection is outlined on the patient eligibility and treatment selection form.  The specific superficial radiotherapy prescription was determined and was documented on the superficial radiotherapy prescription form.  A treatment calculation was also performed and documented on the treatment calculation form.  Based on the prescription, the patient was scheduled for a series of fractional treatments.
Number Of Treatment Days: 1
Detail Level: Detailed
Field Size (Applicator): 2.0 cm
Limb Positioning Or Elevation: knees bent with sponge
Total Number Of Fractions: 16
Dose Per Fractionation In Cgy (Optional): 320.5
Daily Fractionated Dose (Optional- Include Units): 320.5 cGy
Treatment Device Design After Initial Simulation Justification (Will Render If Bill For Treatment Devices = Yes): The patient is status post radiation simulation and is evaluated as to the use of additional devices for shielding and placement for radiation therapy.
Time Dose Fractionation (Optional- Include Units If Applicable): 100 TDF
Treatment Time / Fractionation (Optional- Include Units): 0.49 min
Cumulative Dose In Cgy (Optional): 0047
Energy Output In Cgy/Min (Optional): 657
Shielding Size (Optional- Include Units): 1.7  cm
Custom Shielding Preamble Text Will Not Be Included With Simple Simulations (.......... X X Y Cm): A lead shield of 0.762 mm thickness is utilized to form a molded, custom shield with a
Computed Treatment Time In Min (Will Render The Same As Calculated Treatment Time If Left Blank): .49
Simple Simulation Preamble Text Will Be Included With Simple Simulations (.......... Indications): Simple simulation was performed today for the following reasons:
Bill For Radiation Treatment: Yes
Custom Shielding Afterword Text Will Not Be Included With Simple Simulations (X X Y Cm............): port to correlate with the lesion size, including treatment margin. The custom lead shield is adequate to accommodate the appropriate applicator and provide adequate shielding around the treatment site. Additional shielding (as noted below) is used to protect sensitive, normal tissues.
Treatment Time In Min (Optional): 0.49
Body Location Override (Optional): Left dorsum ( bridge of nose)
Treatment Margins In Cm: 0.5
Functional Status: 0 (fully active)

## 2018-01-22 ENCOUNTER — APPOINTMENT (RX ONLY)
Dept: URBAN - METROPOLITAN AREA CLINIC 114 | Facility: CLINIC | Age: 83
Setting detail: DERMATOLOGY
End: 2018-01-22

## 2018-01-22 PROBLEM — C44.311 BASAL CELL CARCINOMA OF SKIN OF NOSE: Status: ACTIVE | Noted: 2018-01-22

## 2018-01-22 PROCEDURE — ? SUPERFICIAL RADIATION TREATMENT

## 2018-01-22 PROCEDURE — 77401: CPT

## 2018-01-22 NOTE — PROCEDURE: SUPERFICIAL RADIATION TREATMENT
Simple Simulation Afterword Text Will Be Included With Simple Simulations (Indications............): The patient had a complete consultation regarding all applicable modalities for the treatment of their skin cancer and based on a variety of factors including the type of tumor, size, and location, the relevant medical history as well as local tissue factors, the functional status of the individual, the ability to perform necessary postoperative wound instructions and the need for simultaneous treatments as well as overall wound healing status, it was determined that the patient would begin radiation therapy treatment for skin cancer.  A full simulation and treatment device design was performed including the determination and formulation of appropriate simple and complex devices including lead shield of 0.762 mm thickness to form molded customized shielding to specifically correlate with the lesion size including treatment margin.  The custom lead shield is adequate to accommodate the appropriate applicator and provide adequate shielding around the treatment site.  The specific field applicator, shields, and devices both simple and complex as well as the specific patient setup is outlined below.  The patient was given a full consent for superficial radiation to both verbally and in writing and the full determination of patient's eligibility for treatment and selection is outlined on the patient eligibility and treatment selection form.  The specific superficial radiotherapy prescription was determined and was documented on the superficial radiotherapy prescription form.  A treatment calculation was also performed and documented on the treatment calculation form.  Based on the prescription, the patient was scheduled for a series of fractional treatments.
Render Prescriptions In Note?: No
Energy Output In Cgy/Min (Optional): 652
Number Of Treatment Days: 1
Assessment: Appropriate reaction
Ssd In Cm (Optional): 15
Additional Prescription Justification Text: If there is any interruption in treatment exceeding 5 days please see Decay and Dose Adjustment Calculation and complete treatment under Prescription 2, 3 or 4 as appropriate.
Shielding Size (Optional- Include Units): 1.7  cm
Fractions / Week Rx 2: 5
Simple Simulation Preamble Text Will Be Included With Simple Simulations (.......... Indications): Simple simulation was performed today for the following reasons:
Bill For Radiation Treatment: Yes
Dose Per Fractionation In Cgy (Optional): 320.5
Patient Positioning: Supine
Field Number: 3
Body Location Override (Optional): Left dorsum ( bridge of nose)
Fractionation Number (Evaluation): 7
Computed Treatment Time In Min (Will Render The Same As Calculated Treatment Time If Left Blank): .49
Field Size (Applicator): 2.0 cm
Depth (Optional-Please Include Units): 1.1 cm
Port Dimensions-X Axis In Cm: 1.7
Day Of The Week Treatment Administered: Monday
Treatment Device Design After Initial Simulation Justification (Will Render If Bill For Treatment Devices = Yes): The patient is status post radiation simulation and is evaluated as to the use of additional devices for shielding and placement for radiation therapy.
Treatment Time In Min (Optional): 0.49
Energy (Optional-Please Include Units): 70 kV
Time Dose Fractionation (Optional- Include Units If Applicable): 100 TDF
Daily Fractionated Dose (Optional- Include Units): 320.5 cGy
Intro Statement (Will Not Render If Left Blank): The patient is undergoing superficial radiation therapy for skin cancer and presents for weekly evaluation and management.  Per protocol and as documented on the flow sheet, the patient was questioned as to subjective redness, pruritus, pain, drainage, fatigue, or any other symptoms.  Objectively, the radiation area was evaluated with regards to erythema, atrophy, scale, crusting, erosion, ulceration, edema, purpura, tenderness, warmth, drainage, and any other findings.  The plan was extensively reviewed including the dose, and dosing schedule.  The simulation and clinical setup was also reviewed as was the external and any internal shields and based on this review the appropriateness and sufficiency of treatment was determined.
Total Number Of Fractions: 16
Custom Shielding Afterword Text Will Not Be Included With Simple Simulations (X X Y Cm............): port to correlate with the lesion size, including treatment margin. The custom lead shield is adequate to accommodate the appropriate applicator and provide adequate shielding around the treatment site. Additional shielding (as noted below) is used to protect sensitive, normal tissues.
Limb Positioning Or Elevation: knees bent with sponge
Detail Level: Detailed
Treatment Time / Fractionation (Optional- Include Units): 0.49 min
Functional Status: 0 (fully active)
Custom Shielding Preamble Text Will Not Be Included With Simple Simulations (.......... X X Y Cm): A lead shield of 0.762 mm thickness is utilized to form a molded, custom shield with a
Treatment Margins In Cm: 0.5
Fractionation Number: 9
Total Dose (Optional-Please Include Units): 5128.0  cGy
Cumulative Dose In Cgy (Optional): 2884.5

## 2018-01-24 ENCOUNTER — APPOINTMENT (RX ONLY)
Dept: URBAN - METROPOLITAN AREA CLINIC 114 | Facility: CLINIC | Age: 83
Setting detail: DERMATOLOGY
End: 2018-01-24

## 2018-01-24 PROBLEM — C44.311 BASAL CELL CARCINOMA OF SKIN OF NOSE: Status: ACTIVE | Noted: 2018-01-24

## 2018-01-24 PROBLEM — L40.0 PSORIASIS VULGARIS: Status: ACTIVE | Noted: 2018-01-24

## 2018-01-24 PROBLEM — D48.5 NEOPLASM OF UNCERTAIN BEHAVIOR OF SKIN: Status: ACTIVE | Noted: 2018-01-24

## 2018-01-24 PROCEDURE — ? SUPERFICIAL RADIATION TREATMENT

## 2018-01-24 PROCEDURE — 77401: CPT

## 2018-01-24 NOTE — PROCEDURE: SUPERFICIAL RADIATION TREATMENT
Total Number Of Fractions: 16
Fractions / Week Rx 4: 5
Functional Status: 0 (fully active)
Bill For Simulation And Treatment Device Design: No
Assessment: Appropriate reaction
Port Dimensions-X Axis In Cm: 1.7
Shielding Size (Optional- Include Units): 1.7  cm
Fractionation Number (Evaluation): 7
Treatment Time / Fractionation (Optional- Include Units): 0.49 min
Field Size (Applicator): 2.0 cm
Cumulative Dose In Cgy (Optional): 3205
Field Number: 3
Simple Simulation Afterword Text Will Be Included With Simple Simulations (Indications............): The patient had a complete consultation regarding all applicable modalities for the treatment of their skin cancer and based on a variety of factors including the type of tumor, size, and location, the relevant medical history as well as local tissue factors, the functional status of the individual, the ability to perform necessary postoperative wound instructions and the need for simultaneous treatments as well as overall wound healing status, it was determined that the patient would begin radiation therapy treatment for skin cancer.  A full simulation and treatment device design was performed including the determination and formulation of appropriate simple and complex devices including lead shield of 0.762 mm thickness to form molded customized shielding to specifically correlate with the lesion size including treatment margin.  The custom lead shield is adequate to accommodate the appropriate applicator and provide adequate shielding around the treatment site.  The specific field applicator, shields, and devices both simple and complex as well as the specific patient setup is outlined below.  The patient was given a full consent for superficial radiation to both verbally and in writing and the full determination of patient's eligibility for treatment and selection is outlined on the patient eligibility and treatment selection form.  The specific superficial radiotherapy prescription was determined and was documented on the superficial radiotherapy prescription form.  A treatment calculation was also performed and documented on the treatment calculation form.  Based on the prescription, the patient was scheduled for a series of fractional treatments.
Energy Output In Cgy/Min (Optional): 658
Day Of The Week Treatment Administered: Wednesday
Total Number Of Fractions Rx 4: 15
Depth (Optional-Please Include Units): 1.1 cm
Treatment Margins In Cm: 0.5
Number Of Treatment Days: 1
Daily Fractionated Dose (Optional- Include Units): 320.5 cGy
Treatment Time In Min (Optional): 0.49
Simple Simulation Preamble Text Will Be Included With Simple Simulations (.......... Indications): Simple simulation was performed today for the following reasons:
Custom Shielding Afterword Text Will Not Be Included With Simple Simulations (X X Y Cm............): port to correlate with the lesion size, including treatment margin. The custom lead shield is adequate to accommodate the appropriate applicator and provide adequate shielding around the treatment site. Additional shielding (as noted below) is used to protect sensitive, normal tissues.
Custom Shielding Preamble Text Will Not Be Included With Simple Simulations (.......... X X Y Cm): A lead shield of 0.762 mm thickness is utilized to form a molded, custom shield with a
Limb Positioning Or Elevation: knees bent with sponge
Detail Level: Detailed
Time Dose Fractionation (Optional- Include Units If Applicable): 100 TDF
Treatment Device Design After Initial Simulation Justification (Will Render If Bill For Treatment Devices = Yes): The patient is status post radiation simulation and is evaluated as to the use of additional devices for shielding and placement for radiation therapy.
Fractionation Number: 10
Dose / Tx In Cgy (Optional): 320.5
Energy (Optional-Please Include Units): 70 kV
Computed Treatment Time In Min (Will Render The Same As Calculated Treatment Time If Left Blank): .49
Additional Prescription Justification Text: If there is any interruption in treatment exceeding 5 days please see Decay and Dose Adjustment Calculation and complete treatment under Prescription 2, 3 or 4 as appropriate.
Body Location Override (Optional): Left dorsum ( bridge of nose)
Patient Positioning: Supine
Bill For Radiation Treatment: Yes
Total Dose (Optional-Please Include Units): 5128.0  cGy
Intro Statement (Will Not Render If Left Blank): The patient is undergoing superficial radiation therapy for skin cancer and presents for weekly evaluation and management.  Per protocol and as documented on the flow sheet, the patient was questioned as to subjective redness, pruritus, pain, drainage, fatigue, or any other symptoms.  Objectively, the radiation area was evaluated with regards to erythema, atrophy, scale, crusting, erosion, ulceration, edema, purpura, tenderness, warmth, drainage, and any other findings.  The plan was extensively reviewed including the dose, and dosing schedule.  The simulation and clinical setup was also reviewed as was the external and any internal shields and based on this review the appropriateness and sufficiency of treatment was determined.

## 2018-01-26 ENCOUNTER — APPOINTMENT (RX ONLY)
Dept: URBAN - METROPOLITAN AREA CLINIC 114 | Facility: CLINIC | Age: 83
Setting detail: DERMATOLOGY
End: 2018-01-26

## 2018-01-26 PROBLEM — L82.0 INFLAMED SEBORRHEIC KERATOSIS: Status: ACTIVE | Noted: 2018-01-26

## 2018-01-26 PROBLEM — C44.311 BASAL CELL CARCINOMA OF SKIN OF NOSE: Status: ACTIVE | Noted: 2018-01-26

## 2018-01-26 PROCEDURE — 77401: CPT

## 2018-01-26 PROCEDURE — ? SUPERFICIAL RADIATION TREATMENT

## 2018-01-26 NOTE — PROCEDURE: SUPERFICIAL RADIATION TREATMENT
Dose / Tx In Cgy (Optional): 320.5
Render Prescriptions In Note?: No
Energy (Optional-Please Include Units): 70 kV
Computed Treatment Time In Min (Will Render The Same As Calculated Treatment Time If Left Blank): .49
Treatment Device Design After Initial Simulation Justification (Will Render If Bill For Treatment Devices = Yes): The patient is status post radiation simulation and is evaluated as to the use of additional devices for shielding and placement for radiation therapy.
Additional Prescription Justification Text: If there is any interruption in treatment exceeding 5 days please see Decay and Dose Adjustment Calculation and complete treatment under Prescription 2, 3 or 4 as appropriate.
Initial Radiation Treatment Planning (Will Render If Bill Simulation = Yes): The patient had a complete consultation regarding all applicable modalities for the treatment of their skin cancer and based on a variety of factors including the type of tumor, size, and location, the relevant medical history as well as local tissue factors, the functional status of the individual, the ability to perform necessary postoperative wound instructions and the need for simultaneous treatments as well as overall wound healing status, it was determined that the patient would begin radiation therapy treatment for skin cancer.  A full simulation and treatment device design was performed including the determination and formulation of appropriate simple and complex devices including lead shield of 0.762 mm thickness to form molded customized shielding to specifically correlate with the lesion size including treatment margin.  The custom lead shield is adequate to accommodate the appropriate applicator and provide adequate shielding around the treatment site.  The specific field applicator, shields, and devices both simple and complex as well as the specific patient setup is outlined below.  The patient was given a full consent for superficial radiation to both verbally and in writing and the full determination of patient's eligibility for treatment and selection is outlined on the patient eligibility and treatment selection form.  The specific superficial radiotherapy prescription was determined and was documented on the superficial radiotherapy prescription form.  A treatment calculation was also performed and documented on the treatment calculation form.  Based on the prescription, the patient was scheduled for a series of fractional treatments.
Total Number Of Fractions Rx 3: 15
Number Of Treatment Days: 1
Limb Positioning Or Elevation: knees bent with sponge
Treatment Margins In Cm: 0.5
Fractions / Week: 3
Custom Shielding Afterword Text Will Not Be Included With Simple Simulations (X X Y Cm............): port to correlate with the lesion size, including treatment margin. The custom lead shield is adequate to accommodate the appropriate applicator and provide adequate shielding around the treatment site. Additional shielding (as noted below) is used to protect sensitive, normal tissues.
Total Number Of Fractions: 16
Dimensions-Y Axis In Cm: 1.7
Intro Statement (Will Not Render If Left Blank): The patient is undergoing superficial radiation therapy for skin cancer and presents for weekly evaluation and management.  Per protocol and as documented on the flow sheet, the patient was questioned as to subjective redness, pruritus, pain, drainage, fatigue, or any other symptoms.  Objectively, the radiation area was evaluated with regards to erythema, atrophy, scale, crusting, erosion, ulceration, edema, purpura, tenderness, warmth, drainage, and any other findings.  The plan was extensively reviewed including the dose, and dosing schedule.  The simulation and clinical setup was also reviewed as was the external and any internal shields and based on this review the appropriateness and sufficiency of treatment was determined.
Functional Status: 0 (fully active)
Time Dose Fractionation (Optional- Include Units If Applicable): 100 TDF
Treatment Time In Min (Optional): 0.49
Field Size (Applicator): 2.0 cm
Energy Output In Cgy/Min (Optional): 65
Bill For Radiation Treatment: Yes
Total Dose (Optional-Please Include Units): 5128.0  cGy
Detail Level: Detailed
Daily Fractionated Dose (Optional- Include Units): 320.5 cGy
Body Location Override (Optional): Left dorsum ( bridge of nose)
Shielding Size (Optional- Include Units): 1.7  cm
Treatment Time / Fractionation (Optional- Include Units): 0.49 min
Patient Positioning: Supine
Simple Simulation Preamble Text Will Be Included With Simple Simulations (.......... Indications): Simple simulation was performed today for the following reasons:
Depth (Optional-Please Include Units): 1.1 cm
Fractions / Week Rx 2: 5
Fractionation Number: 11
Assessment: Appropriate reaction
Day Of The Week Treatment Administered: Friday
Custom Shielding Preamble Text Will Not Be Included With Simple Simulations (.......... X X Y Cm): A lead shield of 0.762 mm thickness is utilized to form a molded, custom shield with a
Cumulative Dose In Cgy (Optional): 3525.5
Fractionation Number (Evaluation): 7

## 2018-01-29 ENCOUNTER — APPOINTMENT (RX ONLY)
Dept: URBAN - METROPOLITAN AREA CLINIC 114 | Facility: CLINIC | Age: 83
Setting detail: DERMATOLOGY
End: 2018-01-29

## 2018-01-29 PROBLEM — Z85.828 PERSONAL HISTORY OF OTHER MALIGNANT NEOPLASM OF SKIN: Status: ACTIVE | Noted: 2018-01-29

## 2018-01-29 PROBLEM — C44.311 BASAL CELL CARCINOMA OF SKIN OF NOSE: Status: ACTIVE | Noted: 2018-01-29

## 2018-01-29 PROBLEM — D48.5 NEOPLASM OF UNCERTAIN BEHAVIOR OF SKIN: Status: ACTIVE | Noted: 2018-01-29

## 2018-01-29 PROBLEM — L57.0 ACTINIC KERATOSIS: Status: ACTIVE | Noted: 2018-01-29

## 2018-01-29 PROCEDURE — 77401: CPT

## 2018-01-29 PROCEDURE — ? SUPERFICIAL RADIATION TREATMENT

## 2018-01-29 NOTE — PROCEDURE: SUPERFICIAL RADIATION TREATMENT
Initial Radiation Treatment Planning (Will Render If Bill Simulation = Yes): The patient had a complete consultation regarding all applicable modalities for the treatment of their skin cancer and based on a variety of factors including the type of tumor, size, and location, the relevant medical history as well as local tissue factors, the functional status of the individual, the ability to perform necessary postoperative wound instructions and the need for simultaneous treatments as well as overall wound healing status, it was determined that the patient would begin radiation therapy treatment for skin cancer.  A full simulation and treatment device design was performed including the determination and formulation of appropriate simple and complex devices including lead shield of 0.762 mm thickness to form molded customized shielding to specifically correlate with the lesion size including treatment margin.  The custom lead shield is adequate to accommodate the appropriate applicator and provide adequate shielding around the treatment site.  The specific field applicator, shields, and devices both simple and complex as well as the specific patient setup is outlined below.  The patient was given a full consent for superficial radiation to both verbally and in writing and the full determination of patient's eligibility for treatment and selection is outlined on the patient eligibility and treatment selection form.  The specific superficial radiotherapy prescription was determined and was documented on the superficial radiotherapy prescription form.  A treatment calculation was also performed and documented on the treatment calculation form.  Based on the prescription, the patient was scheduled for a series of fractional treatments.
Field Number: 3
Number Of Treatment Days: 1
Day Of The Week Treatment Administered: Monday
Render Prescriptions In Note?: No
Patient Positioning: Supine
Port Dimensions-X Axis In Cm: 1.7
Field Size (Applicator): 2.0 cm
Energy Output In Cgy/Min (Optional): 658
Fractions / Week Rx 4: 5
Limb Positioning Or Elevation: knees bent with sponge
Treatment Device Design After Initial Simulation Justification (Will Render If Bill For Treatment Devices = Yes): The patient is status post radiation simulation and is evaluated as to the use of additional devices for shielding and placement for radiation therapy.
Fractionation Number: 11
Computed Treatment Time In Min (Will Render The Same As Calculated Treatment Time If Left Blank): .49
Treatment Time In Min (Optional): 0.49
Additional Prescription Justification Text: If there is any interruption in treatment exceeding 5 days please see Decay and Dose Adjustment Calculation and complete treatment under Prescription 2, 3 or 4 as appropriate.
Treatment Margins In Cm: 0.5
Energy (Optional-Please Include Units): 70 kV
Total Dose (Optional-Please Include Units): 5128.0  cGy
Dose Per Fractionation In Cgy (Optional): 320.5
Detail Level: Detailed
Ssd In Cm (Optional): 15
Body Location Override (Optional): Left dorsum ( bridge of nose)
Shielding Size (Optional- Include Units): 1.7  cm
Time Dose Fractionation (Optional- Include Units If Applicable): 100 TDF
Daily Fractionated Dose (Optional- Include Units): 320.5 cGy
Depth (Optional-Please Include Units): 1.1 cm
Total Number Of Fractions: 16
Functional Status: 0 (fully active)
Fractionation Number (Evaluation): 7
Treatment Time / Fractionation (Optional- Include Units): 0.49 min
Intro Statement (Will Not Render If Left Blank): The patient is undergoing superficial radiation therapy for skin cancer and presents for weekly evaluation and management.  Per protocol and as documented on the flow sheet, the patient was questioned as to subjective redness, pruritus, pain, drainage, fatigue, or any other symptoms.  Objectively, the radiation area was evaluated with regards to erythema, atrophy, scale, crusting, erosion, ulceration, edema, purpura, tenderness, warmth, drainage, and any other findings.  The plan was extensively reviewed including the dose, and dosing schedule.  The simulation and clinical setup was also reviewed as was the external and any internal shields and based on this review the appropriateness and sufficiency of treatment was determined.
Custom Shielding Afterword Text Will Not Be Included With Simple Simulations (X X Y Cm............): port to correlate with the lesion size, including treatment margin. The custom lead shield is adequate to accommodate the appropriate applicator and provide adequate shielding around the treatment site. Additional shielding (as noted below) is used to protect sensitive, normal tissues.
Custom Shielding Preamble Text Will Not Be Included With Simple Simulations (.......... X X Y Cm): A lead shield of 0.762 mm thickness is utilized to form a molded, custom shield with a
Cumulative Dose In Cgy (Optional): 1976
Bill For Radiation Treatment: Yes
Simple Simulation Preamble Text Will Be Included With Simple Simulations (.......... Indications): Simple simulation was performed today for the following reasons:
Assessment: Appropriate reaction

## 2018-01-31 ENCOUNTER — APPOINTMENT (RX ONLY)
Dept: URBAN - METROPOLITAN AREA CLINIC 114 | Facility: CLINIC | Age: 83
Setting detail: DERMATOLOGY
End: 2018-01-31

## 2018-01-31 PROBLEM — C44.311 BASAL CELL CARCINOMA OF SKIN OF NOSE: Status: ACTIVE | Noted: 2018-01-31

## 2018-01-31 PROBLEM — L57.0 ACTINIC KERATOSIS: Status: ACTIVE | Noted: 2018-01-31

## 2018-01-31 PROCEDURE — ? SUPERFICIAL RADIATION TREATMENT

## 2018-01-31 PROCEDURE — 77401: CPT

## 2018-01-31 NOTE — PROCEDURE: SUPERFICIAL RADIATION TREATMENT
Treatment Device Design After Initial Simulation Justification (Will Render If Bill For Treatment Devices = Yes): The patient is status post radiation simulation and is evaluated as to the use of additional devices for shielding and placement for radiation therapy.
Render Text From Evaluation And Management Tab (Will Not Bill [id number]): No
Fractions / Week: 3
Day Of The Week Treatment Administered: Wednesday
Patient Positioning: Supine
Daily Fractionated Dose (Optional- Include Units): 320.5 cGy
Total Number Of Fractions Rx 4: 15
Fractions / Week Rx 4: 5
Functional Status: 0 (fully active)
Number Of Days Off Treatment: 1
Field Size (Applicator): 2.0 cm
Dimensions-Y Axis In Cm: 1.7
Intro Statement (Will Not Render If Left Blank): The patient is undergoing superficial radiation therapy for skin cancer and presents for weekly evaluation and management.  Per protocol and as documented on the flow sheet, the patient was questioned as to subjective redness, pruritus, pain, drainage, fatigue, or any other symptoms.  Objectively, the radiation area was evaluated with regards to erythema, atrophy, scale, crusting, erosion, ulceration, edema, purpura, tenderness, warmth, drainage, and any other findings.  The plan was extensively reviewed including the dose, and dosing schedule.  The simulation and clinical setup was also reviewed as was the external and any internal shields and based on this review the appropriateness and sufficiency of treatment was determined.
Additional Prescription Justification Text: If there is any interruption in treatment exceeding 5 days please see Decay and Dose Adjustment Calculation and complete treatment under Prescription 2, 3 or 4 as appropriate.
Depth (Optional-Please Include Units): 1.1 cm
Treatment Time In Min (Optional): 0.49
Limb Positioning Or Elevation: knees bent with sponge
Total Dose (Optional-Please Include Units): 5128.0  cGy
Simple Simulation Preamble Text Will Be Included With Simple Simulations (.......... Indications): Simple simulation was performed today for the following reasons:
Energy (Include Units): 70 kV
Total Number Of Fractions: 16
Body Location Override (Optional): Left dorsum ( bridge of nose)
Initial Radiation Treatment Planning (Will Render If Bill Simulation = Yes): The patient had a complete consultation regarding all applicable modalities for the treatment of their skin cancer and based on a variety of factors including the type of tumor, size, and location, the relevant medical history as well as local tissue factors, the functional status of the individual, the ability to perform necessary postoperative wound instructions and the need for simultaneous treatments as well as overall wound healing status, it was determined that the patient would begin radiation therapy treatment for skin cancer.  A full simulation and treatment device design was performed including the determination and formulation of appropriate simple and complex devices including lead shield of 0.762 mm thickness to form molded customized shielding to specifically correlate with the lesion size including treatment margin.  The custom lead shield is adequate to accommodate the appropriate applicator and provide adequate shielding around the treatment site.  The specific field applicator, shields, and devices both simple and complex as well as the specific patient setup is outlined below.  The patient was given a full consent for superficial radiation to both verbally and in writing and the full determination of patient's eligibility for treatment and selection is outlined on the patient eligibility and treatment selection form.  The specific superficial radiotherapy prescription was determined and was documented on the superficial radiotherapy prescription form.  A treatment calculation was also performed and documented on the treatment calculation form.  Based on the prescription, the patient was scheduled for a series of fractional treatments.
Shielding Size (Optional- Include Units): 1.7  cm
Fractionation Number (Evaluation): 7
Time Dose Fractionation (Optional- Include Units If Applicable): 100 TDF
Assessment: Appropriate reaction
Custom Shielding Afterword Text Will Not Be Included With Simple Simulations (X X Y Cm............): port to correlate with the lesion size, including treatment margin. The custom lead shield is adequate to accommodate the appropriate applicator and provide adequate shielding around the treatment site. Additional shielding (as noted below) is used to protect sensitive, normal tissues.
Dose / Tx In Cgy (Optional): 320.5
Custom Shielding Preamble Text Will Not Be Included With Simple Simulations (.......... X X Y Cm): A lead shield of 0.762 mm thickness is utilized to form a molded, custom shield with a
Detail Level: Detailed
Energy Output In Cgy/Min (Optional): 655
Cumulative Dose In Cgy (Optional): 7960
Treatment Margins In Cm: 0.5
Computed Treatment Time In Min (Will Render The Same As Calculated Treatment Time If Left Blank): .49
Bill For Radiation Treatment: Yes
Treatment Time / Fractionation (Optional- Include Units): 0.49 min
Fractionation Number: 13

## 2018-02-02 ENCOUNTER — APPOINTMENT (RX ONLY)
Dept: URBAN - METROPOLITAN AREA CLINIC 114 | Facility: CLINIC | Age: 83
Setting detail: DERMATOLOGY
End: 2018-02-02

## 2018-02-02 PROBLEM — D239 BENIGN NEOPLASM OF SKIN, SITE UNSPECIFIED: Status: ACTIVE | Noted: 2018-02-02

## 2018-02-02 PROBLEM — C44.311 BASAL CELL CARCINOMA OF SKIN OF NOSE: Status: ACTIVE | Noted: 2018-02-02

## 2018-02-02 PROBLEM — D48.5 NEOPLASM OF UNCERTAIN BEHAVIOR OF SKIN: Status: ACTIVE | Noted: 2018-02-02

## 2018-02-02 PROCEDURE — 77401: CPT

## 2018-02-02 PROCEDURE — ? SUPERFICIAL RADIATION TREATMENT

## 2018-02-02 NOTE — PROCEDURE: SUPERFICIAL RADIATION TREATMENT
Include Rx 4 When Rendering Additional Prescriptions: No
Custom Shielding Preamble Text Will Not Be Included With Simple Simulations (.......... X X Y Cm): A lead shield of 0.762 mm thickness is utilized to form a molded, custom shield with a
Day Of The Week Treatment Administered: Friday
Total Number Of Fractions: 16
Custom Shielding Afterword Text Will Not Be Included With Simple Simulations (X X Y Cm............): port to correlate with the lesion size, including treatment margin. The custom lead shield is adequate to accommodate the appropriate applicator and provide adequate shielding around the treatment site. Additional shielding (as noted below) is used to protect sensitive, normal tissues.
Total Dose (Optional-Please Include Units): 5128.0  cGy
Field Size (Applicator): 2.0 cm
Initial Radiation Treatment Planning (Will Render If Bill Simulation = Yes): The patient had a complete consultation regarding all applicable modalities for the treatment of their skin cancer and based on a variety of factors including the type of tumor, size, and location, the relevant medical history as well as local tissue factors, the functional status of the individual, the ability to perform necessary postoperative wound instructions and the need for simultaneous treatments as well as overall wound healing status, it was determined that the patient would begin radiation therapy treatment for skin cancer.  A full simulation and treatment device design was performed including the determination and formulation of appropriate simple and complex devices including lead shield of 0.762 mm thickness to form molded customized shielding to specifically correlate with the lesion size including treatment margin.  The custom lead shield is adequate to accommodate the appropriate applicator and provide adequate shielding around the treatment site.  The specific field applicator, shields, and devices both simple and complex as well as the specific patient setup is outlined below.  The patient was given a full consent for superficial radiation to both verbally and in writing and the full determination of patient's eligibility for treatment and selection is outlined on the patient eligibility and treatment selection form.  The specific superficial radiotherapy prescription was determined and was documented on the superficial radiotherapy prescription form.  A treatment calculation was also performed and documented on the treatment calculation form.  Based on the prescription, the patient was scheduled for a series of fractional treatments.
Shielding Size (Optional- Include Units): 1.7  cm
Body Location Override (Optional): Left dorsum ( bridge of nose)
Treatment Time / Fractionation (Optional- Include Units): 0.49 min
Limb Positioning Or Elevation: knees bent with sponge
Functional Status: 0 (fully active)
Fractions / Week: 3
Depth (Optional-Please Include Units): 1.1 cm
Number Of Days Off Treatment: 1
Total Number Of Fractions Rx 4: 15
Intro Statement (Will Not Render If Left Blank): The patient is undergoing superficial radiation therapy for skin cancer and presents for weekly evaluation and management.  Per protocol and as documented on the flow sheet, the patient was questioned as to subjective redness, pruritus, pain, drainage, fatigue, or any other symptoms.  Objectively, the radiation area was evaluated with regards to erythema, atrophy, scale, crusting, erosion, ulceration, edema, purpura, tenderness, warmth, drainage, and any other findings.  The plan was extensively reviewed including the dose, and dosing schedule.  The simulation and clinical setup was also reviewed as was the external and any internal shields and based on this review the appropriateness and sufficiency of treatment was determined.
Detail Level: Detailed
Port Dimensions-X Axis In Cm: 1.7
Energy (Optional-Please Include Units): 70 kV
Assessment: Appropriate reaction
Fractionation Number (Evaluation): 7
Treatment Time In Min (Optional): 0.49
Treatment Device Design After Initial Simulation Justification (Will Render If Bill For Treatment Devices = Yes): The patient is status post radiation simulation and is evaluated as to the use of additional devices for shielding and placement for radiation therapy.
Fractions / Week Rx 4: 5
Dose / Tx In Cgy (Optional): 320.5
Energy Output In Cgy/Min (Optional): 659
Additional Prescription Justification Text: If there is any interruption in treatment exceeding 5 days please see Decay and Dose Adjustment Calculation and complete treatment under Prescription 2, 3 or 4 as appropriate.
Daily Fractionated Dose (Optional- Include Units): 320.5 cGy
Fractionation Number: 14
Cumulative Dose In Cgy (Optional): 1117
Time Dose Fractionation (Optional- Include Units If Applicable): 100 TDF
Treatment Margins In Cm: 0.5
Computed Treatment Time In Min (Will Render The Same As Calculated Treatment Time If Left Blank): .49
Simple Simulation Preamble Text Will Be Included With Simple Simulations (.......... Indications): Simple simulation was performed today for the following reasons:
Patient Positioning: Supine
Bill For Radiation Treatment: Yes

## 2018-02-05 ENCOUNTER — APPOINTMENT (RX ONLY)
Dept: URBAN - METROPOLITAN AREA CLINIC 114 | Facility: CLINIC | Age: 83
Setting detail: DERMATOLOGY
End: 2018-02-05

## 2018-02-05 PROBLEM — L81.3 CAFÉ AU LAIT SPOTS: Status: ACTIVE | Noted: 2018-02-05

## 2018-02-05 PROBLEM — C44.311 BASAL CELL CARCINOMA OF SKIN OF NOSE: Status: ACTIVE | Noted: 2018-02-05

## 2018-02-05 PROBLEM — D48.5 NEOPLASM OF UNCERTAIN BEHAVIOR OF SKIN: Status: ACTIVE | Noted: 2018-02-05

## 2018-02-05 PROBLEM — C44.91 BASAL CELL CARCINOMA OF SKIN, UNSPECIFIED: Status: ACTIVE | Noted: 2018-02-05

## 2018-02-05 PROBLEM — Z85.828 PERSONAL HISTORY OF OTHER MALIGNANT NEOPLASM OF SKIN: Status: ACTIVE | Noted: 2018-02-05

## 2018-02-05 PROBLEM — L40.0 PSORIASIS VULGARIS: Status: ACTIVE | Noted: 2018-02-05

## 2018-02-05 PROCEDURE — 77401: CPT

## 2018-02-05 PROCEDURE — ? SUPERFICIAL RADIATION TREATMENT

## 2018-02-05 NOTE — PROCEDURE: SUPERFICIAL RADIATION TREATMENT
Total Number Of Fractions Rx 2: 15
Total Number Of Fractions: 16
Dose Per Fractionation In Cgy (Optional): 320.5
Energy Output In Cgy/Min (Optional): 652
Day Of The Week Treatment Administered: Monday
Treatment Margins In Cm: 0.5
Total Dose (Optional-Please Include Units): 5128.0  cGy
Bill For Radiation Treatment: Yes
Energy (Include Units): 70 kV
Shielding Size (Optional- Include Units): 1.7  cm
Simple Simulation Afterword Text Will Be Included With Simple Simulations (Indications............): The patient had a complete consultation regarding all applicable modalities for the treatment of their skin cancer and based on a variety of factors including the type of tumor, size, and location, the relevant medical history as well as local tissue factors, the functional status of the individual, the ability to perform necessary postoperative wound instructions and the need for simultaneous treatments as well as overall wound healing status, it was determined that the patient would begin radiation therapy treatment for skin cancer.  A full simulation and treatment device design was performed including the determination and formulation of appropriate simple and complex devices including lead shield of 0.762 mm thickness to form molded customized shielding to specifically correlate with the lesion size including treatment margin.  The custom lead shield is adequate to accommodate the appropriate applicator and provide adequate shielding around the treatment site.  The specific field applicator, shields, and devices both simple and complex as well as the specific patient setup is outlined below.  The patient was given a full consent for superficial radiation to both verbally and in writing and the full determination of patient's eligibility for treatment and selection is outlined on the patient eligibility and treatment selection form.  The specific superficial radiotherapy prescription was determined and was documented on the superficial radiotherapy prescription form.  A treatment calculation was also performed and documented on the treatment calculation form.  Based on the prescription, the patient was scheduled for a series of fractional treatments.
Bill For Simulation And Treatment Device Design: No
Field Size (Applicator): 2.0 cm
Patient Positioning: Supine
Body Location Override (Optional): Left dorsum ( bridge of nose)
Detail Level: Detailed
Number Of Treatment Days: 1
Dimensions-Y Axis In Cm: 1.7
Cumulative Dose In Cgy (Optional): 4807.5
Time Dose Fractionation (Optional- Include Units If Applicable): 100 TDF
Limb Positioning Or Elevation: knees bent with sponge
Assessment: Appropriate reaction
Computed Treatment Time In Min (Will Render The Same As Calculated Treatment Time If Left Blank): .49
Fractions / Week Rx 3: 3
Treatment Time / Fractionation (Optional- Include Units): 0.49 min
Fractions / Week Rx 2: 5
Fractionation Number (Evaluation): 7
Functional Status: 0 (fully active)
Treatment Time In Min (Optional): 0.49
Custom Shielding Afterword Text Will Not Be Included With Simple Simulations (X X Y Cm............): port to correlate with the lesion size, including treatment margin. The custom lead shield is adequate to accommodate the appropriate applicator and provide adequate shielding around the treatment site. Additional shielding (as noted below) is used to protect sensitive, normal tissues.
Depth (Optional-Please Include Units): 1.1 cm
Intro Statement (Will Not Render If Left Blank): The patient is undergoing superficial radiation therapy for skin cancer and presents for weekly evaluation and management.  Per protocol and as documented on the flow sheet, the patient was questioned as to subjective redness, pruritus, pain, drainage, fatigue, or any other symptoms.  Objectively, the radiation area was evaluated with regards to erythema, atrophy, scale, crusting, erosion, ulceration, edema, purpura, tenderness, warmth, drainage, and any other findings.  The plan was extensively reviewed including the dose, and dosing schedule.  The simulation and clinical setup was also reviewed as was the external and any internal shields and based on this review the appropriateness and sufficiency of treatment was determined.
Treatment Device Design After Initial Simulation Justification (Will Render If Bill For Treatment Devices = Yes): The patient is status post radiation simulation and is evaluated as to the use of additional devices for shielding and placement for radiation therapy.
Daily Fractionated Dose (Optional- Include Units): 320.5 cGy
Simple Simulation Preamble Text Will Be Included With Simple Simulations (.......... Indications): Simple simulation was performed today for the following reasons:
Custom Shielding Preamble Text Will Not Be Included With Simple Simulations (.......... X X Y Cm): A lead shield of 0.762 mm thickness is utilized to form a molded, custom shield with a
Additional Prescription Justification Text: If there is any interruption in treatment exceeding 5 days please see Decay and Dose Adjustment Calculation and complete treatment under Prescription 2, 3 or 4 as appropriate.

## 2018-02-07 ENCOUNTER — APPOINTMENT (RX ONLY)
Dept: URBAN - METROPOLITAN AREA CLINIC 114 | Facility: CLINIC | Age: 83
Setting detail: DERMATOLOGY
End: 2018-02-07

## 2018-02-07 PROBLEM — C44.311 BASAL CELL CARCINOMA OF SKIN OF NOSE: Status: ACTIVE | Noted: 2018-02-07

## 2018-02-07 PROBLEM — C44.91 BASAL CELL CARCINOMA OF SKIN, UNSPECIFIED: Status: ACTIVE | Noted: 2018-02-07

## 2018-02-07 PROCEDURE — 77401: CPT

## 2018-02-07 PROCEDURE — ? SUPERFICIAL RADIATION TREATMENT

## 2018-02-07 PROCEDURE — 99212 OFFICE O/P EST SF 10 MIN: CPT | Mod: 25

## 2018-02-07 NOTE — PROCEDURE: SUPERFICIAL RADIATION TREATMENT
Dose / Tx In Cgy (Optional): 320.5
Total Number Of Fractions: 16
Energy (Include Units): 70 kV
Port Dimensions-X Axis In Cm: 1.7
Body Location Override (Optional): Left dorsum ( bridge of nose)
Include Rx 2 When Rendering Additional Prescriptions: No
Treatment Time In Min (Optional): 0.49
Fractions / Week: 3
Bill For Radiation Treatment: Yes
Custom Shielding Preamble Text Will Not Be Included With Simple Simulations (.......... X X Y Cm): A lead shield of 0.762 mm thickness is utilized to form a molded, custom shield with a
Number Of Treatment Days: 1
Other Assessment (Will Overide Current Assessment): LULY 1
Depth (Optional-Please Include Units): 1.1 cm
Treatment Margins In Cm: 0.5
Treatment Device Design After Initial Simulation Justification (Will Render If Bill For Treatment Devices = Yes): The patient is status post radiation simulation and is evaluated as to the use of additional devices for shielding and placement for radiation therapy.
Detail Level: Detailed
Field Size (Applicator): 2.0 cm
Day Of The Week Treatment Administered: Wednesday
Simple Simulation Preamble Text Will Be Included With Simple Simulations (.......... Indications): Simple simulation was performed today for the following reasons:
Additional Prescription Justification Text: If there is any interruption in treatment exceeding 5 days please see Decay and Dose Adjustment Calculation and complete treatment under Prescription 2, 3 or 4 as appropriate.
Total Number Of Fractions Rx 4: 15
Initial Radiation Treatment Planning (Will Render If Bill Simulation = Yes): The patient had a complete consultation regarding all applicable modalities for the treatment of their skin cancer and based on a variety of factors including the type of tumor, size, and location, the relevant medical history as well as local tissue factors, the functional status of the individual, the ability to perform necessary postoperative wound instructions and the need for simultaneous treatments as well as overall wound healing status, it was determined that the patient would begin radiation therapy treatment for skin cancer.  A full simulation and treatment device design was performed including the determination and formulation of appropriate simple and complex devices including lead shield of 0.762 mm thickness to form molded customized shielding to specifically correlate with the lesion size including treatment margin.  The custom lead shield is adequate to accommodate the appropriate applicator and provide adequate shielding around the treatment site.  The specific field applicator, shields, and devices both simple and complex as well as the specific patient setup is outlined below.  The patient was given a full consent for superficial radiation to both verbally and in writing and the full determination of patient's eligibility for treatment and selection is outlined on the patient eligibility and treatment selection form.  The specific superficial radiotherapy prescription was determined and was documented on the superficial radiotherapy prescription form.  A treatment calculation was also performed and documented on the treatment calculation form.  Based on the prescription, the patient was scheduled for a series of fractional treatments.
Patient Positioning: Supine
Fractions / Week Rx 4: 5
Assessment: Appropriate reaction
Computed Treatment Time In Min (Will Render The Same As Calculated Treatment Time If Left Blank): .49
Custom Shielding Afterword Text Will Not Be Included With Simple Simulations (X X Y Cm............): port to correlate with the lesion size, including treatment margin. The custom lead shield is adequate to accommodate the appropriate applicator and provide adequate shielding around the treatment site. Additional shielding (as noted below) is used to protect sensitive, normal tissues.
Intro Statement (Will Not Render If Left Blank): The patient is undergoing superficial radiation therapy for skin cancer and presents for weekly evaluation and management.  Per protocol and as documented on the flow sheet, the patient was questioned as to subjective redness, pruritus, pain, drainage, fatigue, or any other symptoms.  Objectively, the radiation area was evaluated with regards to erythema, atrophy, scale, crusting, erosion, ulceration, edema, purpura, tenderness, warmth, drainage, and any other findings.  The plan was extensively reviewed including the dose, and dosing schedule.  The simulation and clinical setup was also reviewed as was the external and any internal shields and based on this review the appropriateness and sufficiency of treatment was determined.
Time Dose Fractionation (Optional- Include Units If Applicable): 100 TDF
Limb Positioning Or Elevation: knees bent with sponge
Energy Output In Cgy/Min (Optional): 652
Shielding Size (Optional- Include Units): 1.7  cm
Functional Status: 0 (fully active)
Treatment Time / Fractionation (Optional- Include Units): 0.49 min
Total Dose (Optional-Please Include Units): 5128.0  cGy
Daily Fractionated Dose (Optional- Include Units): 320.5 cGy
Cumulative Dose In Cgy (Optional): 5745

## 2018-03-21 ENCOUNTER — APPOINTMENT (RX ONLY)
Dept: URBAN - METROPOLITAN AREA CLINIC 114 | Facility: CLINIC | Age: 83
Setting detail: DERMATOLOGY
End: 2018-03-21

## 2018-03-21 VITALS — HEIGHT: 74 IN | WEIGHT: 235 LBS

## 2018-03-21 DIAGNOSIS — L57.0 ACTINIC KERATOSIS: ICD-10-CM

## 2018-03-21 DIAGNOSIS — Z85.828 PERSONAL HISTORY OF OTHER MALIGNANT NEOPLASM OF SKIN: ICD-10-CM

## 2018-03-21 PROCEDURE — ? ADDITIONAL NOTES

## 2018-03-21 PROCEDURE — 17004 DESTROY PREMAL LESIONS 15/>: CPT

## 2018-03-21 PROCEDURE — 99214 OFFICE O/P EST MOD 30 MIN: CPT

## 2018-03-21 PROCEDURE — ? CHEMICAL PEEL (PREMALIGNANT DESTRUCTION)

## 2018-03-21 PROCEDURE — ? COUNSELING

## 2018-03-21 ASSESSMENT — LOCATION ZONE DERM
LOCATION ZONE: FACE
LOCATION ZONE: SCALP
LOCATION ZONE: FACE
LOCATION ZONE: NOSE
LOCATION ZONE: SCALP

## 2018-03-21 ASSESSMENT — LOCATION DETAILED DESCRIPTION DERM
LOCATION DETAILED: RIGHT MEDIAL FRONTAL SCALP
LOCATION DETAILED: LEFT SUPERIOR FOREHEAD
LOCATION DETAILED: LEFT NASAL ALA
LOCATION DETAILED: RIGHT SUPERIOR MEDIAL FOREHEAD
LOCATION DETAILED: RIGHT MEDIAL FRONTAL SCALP
LOCATION DETAILED: RIGHT SUPERIOR FOREHEAD
LOCATION DETAILED: LEFT LATERAL FOREHEAD
LOCATION DETAILED: RIGHT FOREHEAD
LOCATION DETAILED: LEFT SUPERIOR FOREHEAD
LOCATION DETAILED: RIGHT SUPERIOR MEDIAL FOREHEAD
LOCATION DETAILED: LEFT SUPERIOR LATERAL FOREHEAD
LOCATION DETAILED: RIGHT LATERAL FOREHEAD
LOCATION DETAILED: RIGHT SUPERIOR FOREHEAD

## 2018-03-21 ASSESSMENT — LOCATION SIMPLE DESCRIPTION DERM
LOCATION SIMPLE: RIGHT FOREHEAD
LOCATION SIMPLE: LEFT NOSE
LOCATION SIMPLE: RIGHT SCALP
LOCATION SIMPLE: LEFT FOREHEAD
LOCATION SIMPLE: RIGHT SCALP
LOCATION SIMPLE: RIGHT FOREHEAD
LOCATION SIMPLE: LEFT FOREHEAD

## 2018-03-21 ASSESSMENT — TOTAL NUMBER OF LESIONS: # OF LESIONS?: 15

## 2018-03-21 NOTE — PROCEDURE: CHEMICAL PEEL (PREMALIGNANT DESTRUCTION)
Consent: Prior to the procedure, written consent was obtained and risks were reviewed, including but not limited to: redness, peeling, blistering, pigmentary change, scarring, infection, and pain.
Treatment Number: 0
Chemical Peel: TCA 30%
Time (Mins): 10
Post-Care Instructions: I reviewed with the patient in detail post-care instructions. Patient should avoid sun exposure and wear sun protection.
Total Number Of Aks Treated: 15
Endpoint: medium frosting
Detail Level: Zone

## 2018-04-04 ENCOUNTER — APPOINTMENT (RX ONLY)
Dept: URBAN - METROPOLITAN AREA CLINIC 114 | Facility: CLINIC | Age: 83
Setting detail: DERMATOLOGY
End: 2018-04-04

## 2018-04-04 VITALS — HEIGHT: 75 IN | WEIGHT: 235 LBS

## 2018-04-04 DIAGNOSIS — Z79.899 OTHER LONG TERM (CURRENT) DRUG THERAPY: ICD-10-CM

## 2018-04-04 DIAGNOSIS — L40.0 PSORIASIS VULGARIS: ICD-10-CM | Status: WELL CONTROLLED

## 2018-04-04 DIAGNOSIS — L57.0 ACTINIC KERATOSIS: ICD-10-CM

## 2018-04-04 PROCEDURE — ? ORDER TESTS

## 2018-04-04 PROCEDURE — ? HIGH RISK MEDICATION MONITORING

## 2018-04-04 PROCEDURE — ? HUMIRA MONITORING

## 2018-04-04 PROCEDURE — 99213 OFFICE O/P EST LOW 20 MIN: CPT

## 2018-04-04 PROCEDURE — ? COUNSELING

## 2018-04-04 PROCEDURE — ? ADDITIONAL NOTES

## 2018-04-04 ASSESSMENT — LOCATION DETAILED DESCRIPTION DERM
LOCATION DETAILED: SUPERIOR MID FOREHEAD
LOCATION DETAILED: PERIUMBILICAL SKIN
LOCATION DETAILED: INFERIOR THORACIC SPINE

## 2018-04-04 ASSESSMENT — LOCATION ZONE DERM
LOCATION ZONE: FACE
LOCATION ZONE: TRUNK

## 2018-04-04 ASSESSMENT — LOCATION SIMPLE DESCRIPTION DERM
LOCATION SIMPLE: ABDOMEN
LOCATION SIMPLE: UPPER BACK
LOCATION SIMPLE: SUPERIOR FOREHEAD

## 2018-04-04 NOTE — PROCEDURE: MIPS QUALITY
Quality 110: Preventive Care And Screening: Influenza Immunization: Influenza Immunization not Administered because Patient Refused.
Quality 111:Pneumonia Vaccination Status For Older Adults: Pneumococcal Vaccination not Administered or Previously Received, Reason not Otherwise Specified
Detail Level: Generalized

## 2018-04-04 NOTE — PROCEDURE: ADDITIONAL NOTES
Additional Notes: Doing well post peel, no treatment needed today.
Additional Notes: Patient tolerating medication with no side effects. He is to continue Humira 40mg every other week.

## 2018-09-14 ENCOUNTER — APPOINTMENT (RX ONLY)
Dept: URBAN - METROPOLITAN AREA CLINIC 114 | Facility: CLINIC | Age: 83
Setting detail: DERMATOLOGY
End: 2018-09-14

## 2018-09-14 VITALS — HEIGHT: 75 IN | WEIGHT: 229 LBS

## 2018-09-14 DIAGNOSIS — L40.0 PSORIASIS VULGARIS: ICD-10-CM

## 2018-09-14 DIAGNOSIS — D485 NEOPLASM OF UNCERTAIN BEHAVIOR OF SKIN: ICD-10-CM

## 2018-09-14 DIAGNOSIS — Z79.899 OTHER LONG TERM (CURRENT) DRUG THERAPY: ICD-10-CM

## 2018-09-14 PROBLEM — L25.9 UNSPECIFIED CONTACT DERMATITIS, UNSPECIFIED CAUSE: Status: ACTIVE | Noted: 2018-09-14

## 2018-09-14 PROBLEM — D48.5 NEOPLASM OF UNCERTAIN BEHAVIOR OF SKIN: Status: ACTIVE | Noted: 2018-09-14

## 2018-09-14 PROCEDURE — 11101: CPT

## 2018-09-14 PROCEDURE — ? HUMIRA MONITORING

## 2018-09-14 PROCEDURE — ? HIGH RISK MEDICATION MONITORING

## 2018-09-14 PROCEDURE — ? COUNSELING

## 2018-09-14 PROCEDURE — ? BIOPSY BY SHAVE METHOD

## 2018-09-14 PROCEDURE — ? ORDER TESTS

## 2018-09-14 PROCEDURE — 11100: CPT

## 2018-09-14 PROCEDURE — 99214 OFFICE O/P EST MOD 30 MIN: CPT | Mod: 25

## 2018-09-14 ASSESSMENT — LOCATION ZONE DERM
LOCATION ZONE: TRUNK
LOCATION ZONE: SCALP
LOCATION ZONE: LEG
LOCATION ZONE: ARM

## 2018-09-14 ASSESSMENT — LOCATION SIMPLE DESCRIPTION DERM
LOCATION SIMPLE: LEFT ELBOW
LOCATION SIMPLE: LEFT PRETIBIAL REGION
LOCATION SIMPLE: LEFT FOREARM
LOCATION SIMPLE: RIGHT PRETIBIAL REGION
LOCATION SIMPLE: RIGHT SCALP
LOCATION SIMPLE: LEFT UPPER BACK
LOCATION SIMPLE: CHEST
LOCATION SIMPLE: RIGHT THIGH
LOCATION SIMPLE: RIGHT ELBOW
LOCATION SIMPLE: RIGHT FOREARM
LOCATION SIMPLE: LEFT THIGH
LOCATION SIMPLE: RIGHT POPLITEAL SKIN
LOCATION SIMPLE: LEFT POPLITEAL SKIN

## 2018-09-14 ASSESSMENT — LOCATION DETAILED DESCRIPTION DERM
LOCATION DETAILED: RIGHT PROXIMAL DORSAL FOREARM
LOCATION DETAILED: LEFT VENTRAL DISTAL FOREARM
LOCATION DETAILED: LEFT MEDIAL UPPER BACK
LOCATION DETAILED: RIGHT MEDIAL INFERIOR CHEST
LOCATION DETAILED: RIGHT MEDIAL FRONTAL SCALP
LOCATION DETAILED: LEFT PROXIMAL DORSAL FOREARM
LOCATION DETAILED: RIGHT ANTERIOR DISTAL THIGH
LOCATION DETAILED: RIGHT ELBOW
LOCATION DETAILED: LEFT ELBOW
LOCATION DETAILED: LEFT ANTERIOR DISTAL THIGH
LOCATION DETAILED: LEFT POPLITEAL SKIN
LOCATION DETAILED: RIGHT POPLITEAL SKIN
LOCATION DETAILED: RIGHT DISTAL DORSAL FOREARM
LOCATION DETAILED: RIGHT VENTRAL DISTAL FOREARM
LOCATION DETAILED: RIGHT PROXIMAL PRETIBIAL REGION
LOCATION DETAILED: LEFT PROXIMAL PRETIBIAL REGION

## 2018-09-14 NOTE — PROCEDURE: HIGH RISK MEDICATION MONITORING
Cyclophosphamide Counseling:  I discussed with the patient the risks of cyclophosphamide including but not limited to hair loss, hormonal abnormalities, decreased fertility, abdominal pain, diarrhea, nausea and vomiting, bone marrow suppression and infection. The patient understands that monitoring is required while taking this medication.
Quinolones Counseling:  I discussed with the patient the risks of fluoroquinolones including but not limited to GI upset, allergic reaction, drug rash, diarrhea, dizziness, photosensitivity, yeast infections, liver function test abnormalities, tendonitis/tendon rupture.
Cephalexin Pregnancy And Lactation Text: This medication is Pregnancy Category B and considered safe during pregnancy.  It is also excreted in breast milk but can be used safely for shorter doses.
Hydroxyzine Counseling: Patient advised that the medication is sedating and not to drive a car after taking this medication.  Patient informed of potential adverse effects including but not limited to dry mouth, urinary retention, and blurry vision.  The patient verbalized understanding of the proper use and possible adverse effects of hydroxyzine.  All of the patient's questions and concerns were addressed.
Taltz Pregnancy And Lactation Text: The risk during pregnancy and breastfeeding is uncertain with this medication.
Topical Clindamycin Counseling: Patient counseled that this medication may cause skin irritation or allergic reactions.  In the event of skin irritation, the patient was advised to reduce the amount of the drug applied or use it less frequently.   The patient verbalized understanding of the proper use and possible adverse effects of clindamycin.  All of the patient's questions and concerns were addressed.
Glycopyrrolate Pregnancy And Lactation Text: This medication is Pregnancy Category B and is considered safe during pregnancy. It is unknown if it is excreted breast milk.
Stelara Counseling:  I discussed with the patient the risks of ustekinumab including but not limited to immunosuppression, malignancy, posterior leukoencephalopathy syndrome, and serious infections.  The patient understands that monitoring is required including a PPD at baseline and must alert us or the primary physician if symptoms of infection or other concerning signs are noted.
Benzoyl Peroxide Counseling: Patient counseled that medicine may cause skin irritation and bleach clothing.  In the event of skin irritation, the patient was advised to reduce the amount of the drug applied or use it less frequently.   The patient verbalized understanding of the proper use and possible adverse effects of benzoyl peroxide.  All of the patient's questions and concerns were addressed.
Prednisone Counseling:  I discussed with the patient the risks of prolonged use of prednisone including but not limited to weight gain, insomnia, osteoporosis, mood changes, diabetes, susceptibility to infection, glaucoma and high blood pressure.  In cases where prednisone use is prolonged, patients should be monitored with blood pressure checks, serum glucose levels and an eye exam.  Additionally, the patient may need to be placed on GI prophylaxis, PCP prophylaxis, and calcium and vitamin D supplementation and/or a bisphosphonate.  The patient verbalized understanding of the proper use and the possible adverse effects of prednisone.  All of the patient's questions and concerns were addressed.
Fluconazole Counseling:  Patient counseled regarding adverse effects of fluconazole including but not limited to headache, diarrhea, nausea, upset stomach, liver function test abnormalities, taste disturbance, and stomach pain.  There is a rare possibility of liver failure that can occur when taking fluconazole.  The patient understands that monitoring of LFTs and kidney function test may be required, especially at baseline. The patient verbalized understanding of the proper use and possible adverse effects of fluconazole.  All of the patient's questions and concerns were addressed.
Protopic Pregnancy And Lactation Text: This medication is Pregnancy Category C. It is unknown if this medication is excreted in breast milk when applied topically.
Erivedge Pregnancy And Lactation Text: This medication is Pregnancy Category X and is absolutely contraindicated during pregnancy. It is unknown if it is excreted in breast milk.
Arava Counseling:  Patient counseled regarding adverse effects of Arava including but not limited to nausea, vomiting, abnormalities in liver function tests. Patients may develop mouth sores, rash, diarrhea, and abnormalities in blood counts. The patient understands that monitoring is required including LFTs and blood counts.  There is a rare possibility of scarring of the liver and lung problems that can occur when taking methotrexate. Persistent nausea, loss of appetite, pale stools, dark urine, cough, and shortness of breath should be reported immediately. Patient advised to discontinue Arava treatment and consult with a physician prior to attempting conception. The patient will have to undergo a treatment to eliminate Arava from the body prior to conception.
Solaraze Pregnancy And Lactation Text: This medication is Pregnancy Category B and is considered safe. There is some data to suggest avoiding during the third trimester. It is unknown if this medication is excreted in breast milk.
Cyclophosphamide Pregnancy And Lactation Text: This medication is Pregnancy Category D and it isn't considered safe during pregnancy. This medication is excreted in breast milk.
Bactrim Counseling:  I discussed with the patient the risks of sulfa antibiotics including but not limited to GI upset, allergic reaction, drug rash, diarrhea, dizziness, photosensitivity, and yeast infections.  Rarely, more serious reactions can occur including but not limited to aplastic anemia, agranulocytosis, methemoglobinemia, blood dyscrasias, liver or kidney failure, lung infiltrates or desquamative/blistering drug rashes.
Azithromycin Counseling:  I discussed with the patient the risks of azithromycin including but not limited to GI upset, allergic reaction, drug rash, diarrhea, and yeast infections.
Terbinafine Pregnancy And Lactation Text: This medication is Pregnancy Category B and is considered safe during pregnancy. It is also excreted in breast milk and breast feeding isn't recommended.
Drysol Pregnancy And Lactation Text: This medication is considered safe during pregnancy and breast feeding.
Solaraze Counseling:  I discussed with the patient the risks of Solaraze including but not limited to erythema, scaling, itching, weeping, crusting, and pain.
Gabapentin Pregnancy And Lactation Text: This medication is Pregnancy Category C and isn't considered safe during pregnancy. It is excreted in breast milk.
Hydroxychloroquine Pregnancy And Lactation Text: This medication has been shown to cause fetal harm but it isn't assigned a Pregnancy Risk Category. There are small amounts excreted in breast milk.
Rituxan Pregnancy And Lactation Text: This medication is Pregnancy Category C and it isn't know if it is safe during pregnancy. It is unknown if this medication is excreted in breast milk but similar antibodies are known to be excreted.
Xelmelvinz Pregnancy And Lactation Text: This medication is Pregnancy Category D and is not considered safe during pregnancy.  The risk during breast feeding is also uncertain.
Clindamycin Pregnancy And Lactation Text: This medication can be used in pregnancy if certain situations. Clindamycin is also present in breast milk.
Albendazole Pregnancy And Lactation Text: This medication is Pregnancy Category C and it isn't known if it is safe during pregnancy. It is also excreted in breast milk.
High Dose Vitamin A Counseling: Side effects reviewed, pt to contact office should one occur.
Fluconazole Pregnancy And Lactation Text: This medication is Pregnancy Category C and it isn't know if it is safe during pregnancy. It is also excreted in breast milk.
Zyclara Pregnancy And Lactation Text: This medication is Pregnancy Category C. It is unknown if this medication is excreted in breast milk.
Metronidazole Counseling:  I discussed with the patient the risks of metronidazole including but not limited to seizures, nausea/vomiting, a metallic taste in the mouth, nausea/vomiting and severe allergy.
Spironolactone Counseling: Patient advised regarding risks of diarrhea, abdominal pain, hyperkalemia, birth defects (for female patients), liver toxicity and renal toxicity. The patient may need blood work to monitor liver and kidney function and potassium levels while on therapy. The patient verbalized understanding of the proper use and possible adverse effects of spironolactone.  All of the patient's questions and concerns were addressed.
Thalidomide Counseling: I discussed with the patient the risks of thalidomide including but not limited to birth defects, anxiety, weakness, chest pain, dizziness, cough and severe allergy.
Erythromycin Pregnancy And Lactation Text: This medication is Pregnancy Category B and is considered safe during pregnancy. It is also excreted in breast milk.
Tetracycline Counseling: Patient counseled regarding possible photosensitivity and increased risk for sunburn.  Patient instructed to avoid sunlight, if possible.  When exposed to sunlight, patients should wear protective clothing, sunglasses, and sunscreen.  The patient was instructed to call the office immediately if the following severe adverse effects occur:  hearing changes, easy bruising/bleeding, severe headache, or vision changes.  The patient verbalized understanding of the proper use and possible adverse effects of tetracycline.  All of the patient's questions and concerns were addressed. Patient understands to avoid pregnancy while on therapy due to potential birth defects.
Valtrex Pregnancy And Lactation Text: this medication is Pregnancy Category B and is considered safe during pregnancy. This medication is not directly found in breast milk but it's metabolite acyclovir is present.
Griseofulvin Counseling:  I discussed with the patient the risks of griseofulvin including but not limited to photosensitivity, cytopenia, liver damage, nausea/vomiting and severe allergy.  The patient understands that this medication is best absorbed when taken with a fatty meal (e.g., ice cream or french fries).
Methotrexate Counseling:  Patient counseled regarding adverse effects of methotrexate including but not limited to nausea, vomiting, abnormalities in liver function tests. Patients may develop mouth sores, rash, diarrhea, and abnormalities in blood counts. The patient understands that monitoring is required including LFT's and blood counts.  There is a rare possibility of scarring of the liver and lung problems that can occur when taking methotrexate. Persistent nausea, loss of appetite, pale stools, dark urine, cough, and shortness of breath should be reported immediately. Patient advised to discontinue methotrexate treatment at least three months before attempting to become pregnant.  I discussed the need for folate supplements while taking methotrexate.  These supplements can decrease side effects during methotrexate treatment. The patient verbalized understanding of the proper use and possible adverse effects of methotrexate.  All of the patient's questions and concerns were addressed.
Valtrex Counseling: I discussed with the patient the risks of valacyclovir including but not limited to kidney damage, nausea, vomiting and severe allergy.  The patient understands that if the infection seems to be worsening or is not improving, they are to call.
Doxepin Counseling:  Patient advised that the medication is sedating and not to drive a car after taking this medication. Patient informed of potential adverse effects including but not limited to dry mouth, urinary retention, and blurry vision.  The patient verbalized understanding of the proper use and possible adverse effects of doxepin.  All of the patient's questions and concerns were addressed.
Imiquimod Counseling:  I discussed with the patient the risks of imiquimod including but not limited to erythema, scaling, itching, weeping, crusting, and pain.  Patient understands that the inflammatory response to imiquimod is variable from person to person and was educated regarded proper titration schedule.  If flu-like symptoms develop, patient knows to discontinue the medication and contact us.
Methotrexate Pregnancy And Lactation Text: This medication is Pregnancy Category X and is known to cause fetal harm. This medication is excreted in breast milk.
Griseofulvin Pregnancy And Lactation Text: This medication is Pregnancy Category X and is known to cause serious birth defects. It is unknown if this medication is excreted in breast milk but breast feeding should be avoided.
Otezla Counseling: The side effects of Otezla were discussed with the patient, including but not limited to worsening or new depression, weight loss, diarrhea, nausea, upper respiratory tract infection, and headache. Patient instructed to call the office should any adverse effect occur.  The patient verbalized understanding of the proper use and possible adverse effects of Otezla.  All the patient's questions and concerns were addressed.
Topical Retinoid counseling:  Patient advised to apply a pea-sized amount only at bedtime and wait 30 minutes after washing their face before applying.  If too drying, patient may add a non-comedogenic moisturizer. The patient verbalized understanding of the proper use and possible adverse effects of retinoids.  All of the patient's questions and concerns were addressed.
Simponi Counseling:  I discussed with the patient the risks of golimumab including but not limited to myelosuppression, immunosuppression, autoimmune hepatitis, demyelinating diseases, lymphoma, and serious infections.  The patient understands that monitoring is required including a PPD at baseline and must alert us or the primary physician if symptoms of infection or other concerning signs are noted.
Spironolactone Pregnancy And Lactation Text: This medication can cause feminization of the male fetus and should be avoided during pregnancy. The active metabolite is also found in breast milk.
Minocycline Counseling: Patient advised regarding possible photosensitivity and discoloration of the teeth, skin, lips, tongue and gums.  Patient instructed to avoid sunlight, if possible.  When exposed to sunlight, patients should wear protective clothing, sunglasses, and sunscreen.  The patient was instructed to call the office immediately if the following severe adverse effects occur:  hearing changes, easy bruising/bleeding, severe headache, or vision changes.  The patient verbalized understanding of the proper use and possible adverse effects of minocycline.  All of the patient's questions and concerns were addressed.
Erivedge Counseling- I discussed with the patient the risks of Erivedge including but not limited to nausea, vomiting, diarrhea, constipation, weight loss, changes in the sense of taste, decreased appetite, muscle spasms, and hair loss.  The patient verbalized understanding of the proper use and possible adverse effects of Erivedge.  All of the patient's questions and concerns were addressed.
Birth Control Pills Pregnancy And Lactation Text: This medication should be avoided if pregnant and for the first 30 days post-partum.
SSKI Counseling:  I discussed with the patient the risks of SSKI including but not limited to thyroid abnormalities, metallic taste, GI upset, fever, headache, acne, arthralgias, paraesthesias, lymphadenopathy, easy bleeding, arrhythmias, and allergic reaction.
Cellcept Counseling:  I discussed with the patient the risks of mycophenolate mofetil including but not limited to infection/immunosuppression, GI upset, hypokalemia, hypercholesterolemia, bone marrow suppression, lymphoproliferative disorders, malignancy, GI ulceration/bleed/perforation, colitis, interstitial lung disease, kidney failure, progressive multifocal leukoencephalopathy, and birth defects.  The patient understands that monitoring is required including a baseline creatinine and regular CBC testing. In addition, patient must alert us immediately if symptoms of infection or other concerning signs are noted.
Benzoyl Peroxide Pregnancy And Lactation Text: This medication is Pregnancy Category C. It is unknown if benzoyl peroxide is excreted in breast milk.
Detail Level: Zone
Odomzo Counseling- I discussed with the patient the risks of Odomzo including but not limited to nausea, vomiting, diarrhea, constipation, weight loss, changes in the sense of taste, decreased appetite, muscle spasms, and hair loss.  The patient verbalized understanding of the proper use and possible adverse effects of Odomzo.  All of the patient's questions and concerns were addressed.
5-Fu Counseling: 5-Fluorouracil Counseling:  I discussed with the patient the risks of 5-fluorouracil including but not limited to erythema, scaling, itching, weeping, crusting, and pain.
Prednisone Pregnancy And Lactation Text: This medication is Pregnancy Category C and it isn't know if it is safe during pregnancy. This medication is excreted in breast milk.
Minocycline Pregnancy And Lactation Text: This medication is Pregnancy Category D and not consider safe during pregnancy. It is also excreted in breast milk.
Tazorac Counseling:  Patient advised that medication is irritating and drying.  Patient may need to apply sparingly and wash off after an hour before eventually leaving it on overnight.  The patient verbalized understanding of the proper use and possible adverse effects of tazorac.  All of the patient's questions and concerns were addressed.
Ivermectin Counseling:  Patient instructed to take medication on an empty stomach with a full glass of water.  Patient informed of potential adverse effects including but not limited to nausea, diarrhea, dizziness, itching, and swelling of the extremities or lymph nodes.  The patient verbalized understanding of the proper use and possible adverse effects of ivermectin.  All of the patient's questions and concerns were addressed.
Siliq Counseling:  I discussed with the patient the risks of Siliq including but not limited to new or worsening depression, suicidal thoughts and behavior, immunosuppression, malignancy, posterior leukoencephalopathy syndrome, and serious infections.  The patient understands that monitoring is required including a PPD at baseline and must alert us or the primary physician if symptoms of infection or other concerning signs are noted. There is also a special program designed to monitor depression which is required with Siliq.
Drysol Counseling:  I discussed with the patient the risks of drysol/aluminum chloride including but not limited to skin rash, itching, irritation, burning.
Gabapentin Counseling: I discussed with the patient the risks of gabapentin including but not limited to dizziness, somnolence, fatigue and ataxia.
Infliximab Counseling:  I discussed with the patient the risks of infliximab including but not limited to myelosuppression, immunosuppression, autoimmune hepatitis, demyelinating diseases, lymphoma, and serious infections.  The patient understands that monitoring is required including a PPD at baseline and must alert us or the primary physician if symptoms of infection or other concerning signs are noted.
Rifampin Counseling: I discussed with the patient the risks of rifampin including but not limited to liver damage, kidney damage, red-orange body fluids, nausea/vomiting and severe allergy.
Oxybutynin Counseling:  I discussed with the patient the risks of oxybutynin including but not limited to skin rash, drowsiness, dry mouth, difficulty urinating, and blurred vision.
Ketoconazole Counseling:   Patient counseled regarding improving absorption with orange juice.  Adverse effects include but are not limited to breast enlargement, headache, diarrhea, nausea, upset stomach, liver function test abnormalities, taste disturbance, and stomach pain.  There is a rare possibility of liver failure that can occur when taking ketoconazole. The patient understands that monitoring of LFTs may be required, especially at baseline. The patient verbalized understanding of the proper use and possible adverse effects of ketoconazole.  All of the patient's questions and concerns were addressed.
Cimzia Pregnancy And Lactation Text: This medication crosses the placenta but can be considered safe in certain situations. Cimzia may be excreted in breast milk.
Itraconazole Counseling:  I discussed with the patient the risks of itraconazole including but not limited to liver damage, nausea/vomiting, neuropathy, and severe allergy.  The patient understands that this medication is best absorbed when taken with acidic beverages such as non-diet cola or ginger ale.  The patient understands that monitoring is required including baseline LFTs and repeat LFTs at intervals.  The patient understands that they are to contact us or the primary physician if concerning signs are noted.
Xolair Counseling:  Patient informed of potential adverse effects including but not limited to fever, muscle aches, rash and allergic reactions.  The patient verbalized understanding of the proper use and possible adverse effects of Xolair.  All of the patient's questions and concerns were addressed.
Terbinafine Counseling: Patient counseling regarding adverse effects of terbinafine including but not limited to headache, diarrhea, rash, upset stomach, liver function test abnormalities, itching, taste/smell disturbance, nausea, abdominal pain, and flatulence.  There is a rare possibility of liver failure that can occur when taking terbinafine.  The patient understands that a baseline LFT and kidney function test may be required. The patient verbalized understanding of the proper use and possible adverse effects of terbinafine.  All of the patient's questions and concerns were addressed.
Minoxidil Pregnancy And Lactation Text: This medication has not been assigned a Pregnancy Risk Category but animal studies failed to show danger with the topical medication. It is unknown if the medication is excreted in breast milk.
Cellcept Pregnancy And Lactation Text: This medication is Pregnancy Category D and isn't considered safe during pregnancy. It is unknown if this medication is excreted in breast milk.
Hydroxyzine Pregnancy And Lactation Text: This medication is not safe during pregnancy and should not be taken. It is also excreted in breast milk and breast feeding isn't recommended.
Carac Pregnancy And Lactation Text: This medication is Pregnancy Category X and contraindicated in pregnancy and in women who may become pregnant. It is unknown if this medication is excreted in breast milk.
Acitretin Pregnancy And Lactation Text: This medication is Pregnancy Category X and should not be given to women who are pregnant or may become pregnant in the future. This medication is excreted in breast milk.
Acitretin Counseling:  I discussed with the patient the risks of acitretin including but not limited to hair loss, dry lips/skin/eyes, liver damage, hyperlipidemia, depression/suicidal ideation, photosensitivity.  Serious rare side effects can include but are not limited to pancreatitis, pseudotumor cerebri, bony changes, clot formation/stroke/heart attack.  Patient understands that alcohol is contraindicated since it can result in liver toxicity and significantly prolong the elimination of the drug by many years.
Colchicine Counseling:  Patient counseled regarding adverse effects including but not limited to stomach upset (nausea, vomiting, stomach pain, or diarrhea).  Patient instructed to limit alcohol consumption while taking this medication.  Colchicine may reduce blood counts especially with prolonged use.  The patient understands that monitoring of kidney function and blood counts may be required, especially at baseline. The patient verbalized understanding of the proper use and possible adverse effects of colchicine.  All of the patient's questions and concerns were addressed.
Elidel Counseling: Patient may experience a mild burning sensation during topical application. Elidel is not approved in children less than 2 years of age. There have been case reports of hematologic and skin malignancies in patients using topical calcineurin inhibitors although causality is questionable.
Dupixent Pregnancy And Lactation Text: This medication likely crosses the placenta but the risk for the fetus is uncertain. This medication is excreted in breast milk.
Humira Pregnancy And Lactation Text: This medication is Pregnancy Category B and is considered safe during pregnancy. It is unknown if this medication is excreted in breast milk.
Oxybutynin Pregnancy And Lactation Text: This medication is Pregnancy Category B and is considered safe during pregnancy. It is unknown if it is excreted in breast milk.
Tazorac Pregnancy And Lactation Text: This medication is not safe during pregnancy. It is unknown if this medication is excreted in breast milk.
Metronidazole Pregnancy And Lactation Text: This medication is Pregnancy Category B and considered safe during pregnancy.  It is also excreted in breast milk.
Minoxidil Counseling: Minoxidil is a topical medication which can increase blood flow where it is applied. It is uncertain how this medication increases hair growth. Side effects are uncommon and include stinging and allergic reactions.
Cephalexin Counseling: I counseled the patient regarding use of cephalexin as an antibiotic for prophylactic and/or therapeutic purposes. Cephalexin (commonly prescribed under brand name Keflex) is a cephalosporin antibiotic which is active against numerous classes of bacteria, including most skin bacteria. Side effects may include nausea, diarrhea, gastrointestinal upset, rash, hives, yeast infections, and in rare cases, hepatitis, kidney disease, seizures, fever, confusion, neurologic symptoms, and others. Patients with severe allergies to penicillin medications are cautioned that there is about a 10% incidence of cross-reactivity with cephalosporins. When possible, patients with penicillin allergies should use alternatives to cephalosporins for antibiotic therapy.
Azithromycin Pregnancy And Lactation Text: This medication is considered safe during pregnancy and is also secreted in breast milk.
Glycopyrrolate Counseling:  I discussed with the patient the risks of glycopyrrolate including but not limited to skin rash, drowsiness, dry mouth, difficulty urinating, and blurred vision.
Nsaids Counseling: NSAID Counseling: I discussed with the patient that NSAIDs should be taken with food. Prolonged use of NSAIDs can result in the development of stomach ulcers.  Patient advised to stop taking NSAIDs if abdominal pain occurs.  The patient verbalized understanding of the proper use and possible adverse effects of NSAIDs.  All of the patient's questions and concerns were addressed.
Rituxan Counseling:  I discussed with the patient the risks of Rituxan infusions. Side effects can include infusion reactions, severe drug rashes including mucocutaneous reactions, reactivation of latent hepatitis and other infections and rarely progressive multifocal leukoencephalopathy.  All of the patient's questions and concerns were addressed.
Doxycycline Counseling:  Patient counseled regarding possible photosensitivity and increased risk for sunburn.  Patient instructed to avoid sunlight, if possible.  When exposed to sunlight, patients should wear protective clothing, sunglasses, and sunscreen.  The patient was instructed to call the office immediately if the following severe adverse effects occur:  hearing changes, easy bruising/bleeding, severe headache, or vision changes.  The patient verbalized understanding of the proper use and possible adverse effects of doxycycline.  All of the patient's questions and concerns were addressed.
Doxycycline Pregnancy And Lactation Text: This medication is Pregnancy Category D and not consider safe during pregnancy. It is also excreted in breast milk but is considered safe for shorter treatment courses.
High Dose Vitamin A Pregnancy And Lactation Text: High dose vitamin A therapy is contraindicated during pregnancy and breast feeding.
Tremfya Counseling: I discussed with the patient the risks of guselkumab including but not limited to immunosuppression, serious infections, worsening of inflammatory bowel disease and drug reactions.  The patient understands that monitoring is required including a PPD at baseline and must alert us or the primary physician if symptoms of infection or other concerning signs are noted.
Hydroxychloroquine Counseling:  I discussed with the patient that a baseline ophthalmologic exam is needed at the start of therapy and every year thereafter while on therapy. A CBC may also be warranted for monitoring.  The side effects of this medication were discussed with the patient, including but not limited to agranulocytosis, aplastic anemia, seizures, rashes, retinopathy, and liver toxicity. Patient instructed to call the office should any adverse effect occur.  The patient verbalized understanding of the proper use and possible adverse effects of Plaquenil.  All the patient's questions and concerns were addressed.
Otezla Pregnancy And Lactation Text: This medication is Pregnancy Category C and it isn't known if it is safe during pregnancy. It is unknown if it is excreted in breast milk.
Sski Pregnancy And Lactation Text: This medication is Pregnancy Category D and isn't considered safe during pregnancy. It is excreted in breast milk.
Protopic Counseling: Patient may experience a mild burning sensation during topical application. Protopic is not approved in children less than 2 years of age. There have been case reports of hematologic and skin malignancies in patients using topical calcineurin inhibitors although causality is questionable.
Dapsone Counseling: I discussed with the patient the risks of dapsone including but not limited to hemolytic anemia, agranulocytosis, rashes, methemoglobinemia, kidney failure, peripheral neuropathy, headaches, GI upset, and liver toxicity.  Patients who start dapsone require monitoring including baseline LFTs and weekly CBCs for the first month, then every month thereafter.  The patient verbalized understanding of the proper use and possible adverse effects of dapsone.  All of the patient's questions and concerns were addressed.
Humira Counseling:  I discussed with the patient the risks of adalimumab including but not limited to myelosuppression, immunosuppression, autoimmune hepatitis, demyelinating diseases, lymphoma, and serious infections.  The patient understands that monitoring is required including a PPD at baseline and must alert us or the primary physician if symptoms of infection or other concerning signs are noted.
Cimetidine Counseling:  I discussed with the patient the risks of Cimetidine including but not limited to gynecomastia, headache, diarrhea, nausea, drowsiness, arrhythmias, pancreatitis, skin rashes, psychosis, bone marrow suppression and kidney toxicity.
Cimzia Counseling:  I discussed with the patient the risks of Cimzia including but not limited to immunosuppression, allergic reactions and infections.  The patient understands that monitoring is required including a PPD at baseline and must alert us or the primary physician if symptoms of infection or other concerning signs are noted.
Birth Control Pills Counseling: Birth Control Pill Counseling: I discussed with the patient the potential side effects of OCPs including but not limited to increased risk of stroke, heart attack, thrombophlebitis, deep venous thrombosis, hepatic adenomas, breast changes, GI upset, headaches, and depression.  The patient verbalized understanding of the proper use and possible adverse effects of OCPs. All of the patient's questions and concerns were addressed.
Albendazole Counseling:  I discussed with the patient the risks of albendazole including but not limited to cytopenia, kidney damage, nausea/vomiting and severe allergy.  The patient understands that this medication is being used in an off-label manner.
Zyclara Counseling:  I discussed with the patient the risks of imiquimod including but not limited to erythema, scaling, itching, weeping, crusting, and pain.  Patient understands that the inflammatory response to imiquimod is variable from person to person and was educated regarded proper titration schedule.  If flu-like symptoms develop, patient knows to discontinue the medication and contact us.
Enbrel Counseling:  I discussed with the patient the risks of etanercept including but not limited to myelosuppression, immunosuppression, autoimmune hepatitis, demyelinating diseases, lymphoma, and infections.  The patient understands that monitoring is required including a PPD at baseline and must alert us or the primary physician if symptoms of infection or other concerning signs are noted.
Taltz Counseling: I discussed with the patient the risks of ixekizumab including but not limited to immunosuppression, serious infections, worsening of inflammatory bowel disease and drug reactions.  The patient understands that monitoring is required including a PPD at baseline and must alert us or the primary physician if symptoms of infection or other concerning signs are noted.
Isotretinoin Pregnancy And Lactation Text: This medication is Pregnancy Category X and is considered extremely dangerous during pregnancy. It is unknown if it is excreted in breast milk.
Bexarotene Counseling:  I discussed with the patient the risks of bexarotene including but not limited to hair loss, dry lips/skin/eyes, liver abnormalities, hyperlipidemia, pancreatitis, depression/suicidal ideation, photosensitivity, drug rash/allergic reactions, hypothyroidism, anemia, leukopenia, infection, cataracts, and teratogenicity.  Patient understands that they will need regular blood tests to check lipid profile, liver function tests, white blood cell count, thyroid function tests and pregnancy test if applicable.
Bactrim Pregnancy And Lactation Text: This medication is Pregnancy Category D and is known to cause fetal risk.  It is also excreted in breast milk.
Clofazimine Counseling:  I discussed with the patient the risks of clofazimine including but not limited to skin and eye pigmentation, liver damage, nausea/vomiting, gastrointestinal bleeding and allergy.
Erythromycin Counseling:  I discussed with the patient the risks of erythromycin including but not limited to GI upset, allergic reaction, drug rash, diarrhea, increase in liver enzymes, and yeast infections.
Isotretinoin Counseling: Patient should get monthly blood tests, not donate blood, not drive at night if vision affected, not share medication, and not undergo elective surgery for 6 months after tx completed. Side effects reviewed, pt to contact office should one occur.
Topical Sulfur Applications Pregnancy And Lactation Text: This medication is Pregnancy Category C and has an unknown safety profile during pregnancy. It is unknown if this topical medication is excreted in breast milk.
Xeljanz Counseling: I discussed with the patient the risks of Xeljanz therapy including increased risk of infection, liver issues, headache, diarrhea, or cold symptoms. Live vaccines should be avoided. They were instructed to call if they have any problems.
Dapsone Pregnancy And Lactation Text: This medication is Pregnancy Category C and is not considered safe during pregnancy or breast feeding.
Picato Counseling:  I discussed with the patient the risks of Picato including but not limited to erythema, scaling, itching, weeping, crusting, and pain.
Carac Counseling:  I discussed with the patient the risks of Carac including but not limited to erythema, scaling, itching, weeping, crusting, and pain.
Dupixent Counseling: I discussed with the patient the risks of dupilumab including but not limited to eye infection and irritation, cold sores, injection site reactions, worsening of asthma, allergic reactions and increased risk of parasitic infection.  Live vaccines should be avoided while taking dupilumab. Dupilumab will also interact with certain medications such as warfarin and cyclosporine. The patient understands that monitoring is required and they must alert us or the primary physician if symptoms of infection or other concerning signs are noted.
Cyclosporine Counseling:  I discussed with the patient the risks of cyclosporine including but not limited to hypertension, gingival hyperplasia,myelosuppression, immunosuppression, liver damage, kidney damage, neurotoxicity, lymphoma, and serious infections. The patient understands that monitoring is required including baseline blood pressure, CBC, CMP, lipid panel and uric acid, and then 1-2 times monthly CMP and blood pressure.
Topical Sulfur Applications Counseling: Topical Sulfur Counseling: Patient counseled that this medication may cause skin irritation or allergic reactions.  In the event of skin irritation, the patient was advised to reduce the amount of the drug applied or use it less frequently.   The patient verbalized understanding of the proper use and possible adverse effects of topical sulfur application.  All of the patient's questions and concerns were addressed.
Doxepin Pregnancy And Lactation Text: This medication is Pregnancy Category C and it isn't known if it is safe during pregnancy. It is also excreted in breast milk and breast feeding isn't recommended.
Bexarotene Pregnancy And Lactation Text: This medication is Pregnancy Category X and should not be given to women who are pregnant or may become pregnant. This medication should not be used if you are breast feeding.
Ketoconazole Pregnancy And Lactation Text: This medication is Pregnancy Category C and it isn't know if it is safe during pregnancy. It is also excreted in breast milk and breast feeding isn't recommended.
Hydroquinone Counseling:  Patient advised that medication may result in skin irritation, lightening (hypopigmentation), dryness, and burning.  In the event of skin irritation, the patient was advised to reduce the amount of the drug applied or use it less frequently.  Rarely, spots that are treated with hydroquinone can become darker (pseudoochronosis).  Should this occur, patient instructed to stop medication and call the office. The patient verbalized understanding of the proper use and possible adverse effects of hydroquinone.  All of the patient's questions and concerns were addressed.
Xolair Pregnancy And Lactation Text: This medication is Pregnancy Category B and is considered safe during pregnancy. This medication is excreted in breast milk.
Nsaids Pregnancy And Lactation Text: These medications are considered safe up to 30 weeks gestation. It is excreted in breast milk.
Cosentyx Counseling:  I discussed with the patient the risks of Cosentyx including but not limited to worsening of Crohn's disease, immunosuppression, allergic reactions and infections.  The patient understands that monitoring is required including a PPD at baseline and must alert us or the primary physician if symptoms of infection or other concerning signs are noted.
Clindamycin Counseling: I counseled the patient regarding use of clindamycin as an antibiotic for prophylactic and/or therapeutic purposes. Clindamycin is active against numerous classes of bacteria, including skin bacteria. Side effects may include nausea, diarrhea, gastrointestinal upset, rash, hives, yeast infections, and in rare cases, colitis.
Rifampin Pregnancy And Lactation Text: This medication is Pregnancy Category C and it isn't know if it is safe during pregnancy. It is also excreted in breast milk and should not be used if you are breast feeding.
Azathioprine Counseling:  I discussed with the patient the risks of azathioprine including but not limited to myelosuppression, immunosuppression, hepatotoxicity, lymphoma, and infections.  The patient understands that monitoring is required including baseline LFTs, Creatinine, possible TPMP genotyping and weekly CBCs for the first month and then every 2 weeks thereafter.  The patient verbalized understanding of the proper use and possible adverse effects of azathioprine.  All of the patient's questions and concerns were addressed.
Eucrisa Counseling: Patient may experience a mild burning sensation during topical application. Eucrisa is not approved in children less than 2 years of age.

## 2018-09-14 NOTE — PROCEDURE: MIPS QUALITY
Quality 111:Pneumonia Vaccination Status For Older Adults: Pneumococcal Vaccination not Administered or Previously Received, Reason not Otherwise Specified
Quality 110: Preventive Care And Screening: Influenza Immunization: Influenza Immunization not Administered because Patient Refused.
Detail Level: Generalized

## 2018-09-14 NOTE — PROCEDURE: BIOPSY BY SHAVE METHOD
Post-Care Instructions: I reviewed with the patient in detail post-care instructions: If your area was covered with a Band-Aid, remove with your next bath and gently clean daily with soap and water.  Do not use hydrogen peroxide. Apply a thin later of Aquaphor/Vaseline and cover with a Band-Aid.  The goal is to keep the wound moist to prevent a scab. It is normal to have a little redness 1/8 inch around the area; however, if increased redness, pain, swelling or discolored drainage occurs, call the office.  If bleeding occurs, hold steady pressure for 10-15 minutes to stop it.  Our office normally receives your results in 7-10 business days and will call or email you when we receive them.  If you do not hear from us in two weeks, please call us.
Detail Level: Detailed
Size Of Lesion In Cm: 1
Render Post-Care Instructions In Note?: yes
Destruction After The Procedure: No
Electrodesiccation Text: The wound bed was treated with electrodesiccation after the biopsy was performed.
Biopsy Method: Personna blade
Depth Of Biopsy: dermis
Silver Nitrate Text: The wound bed was treated with silver nitrate after the biopsy was performed.
Anesthesia Type: 1% lidocaine without epinephrine and a 1:10 solution of 8.4% sodium bicarbonate
Consent: Written consent was obtained and risks were reviewed including but not limited to scarring, infection, bleeding, scabbing, incomplete removal, nerve damage and allergy to anesthesia.
Notification Instructions: Patient will be notified of biopsy results. However, patient instructed to call the office if not contacted within 2 weeks.
Type Of Destruction Used: Curettage
X Size Of Lesion In Cm: 0
Dressing: bandage
Biopsy Type: H and E
Electrodesiccation And Curettage Text: The wound bed was treated with electrodesiccation and curettage after the biopsy was performed.
Wound Care: Polysporin ointment
Hemostasis: Aluminum Chloride
Billing Type: Third-Party Bill
Size Of Lesion In Cm: 0.9

## 2018-09-14 NOTE — PROCEDURE: HUMIRA MONITORING
Add High Risk Medication Management Associated Diagnosis?: No
Detail Level: Detailed
Length Of Therapy: 3+ years
Patient Reported Weight(Optional But Include Units): 225

## 2019-11-02 NOTE — HPI: MEDICATION (HUMIRA)
Is This A New Presentation, Or A Follow-Up?: Follow Up Humira
How Severe Is It?: severe
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 Swelling, warmth around the site of drainage. likely cellulitis. given intermittent fevers and incomplete treatment of skin dominick will get sepsis labs and iv abx. likely admission. imaging of the LLE to rule out tracking.